# Patient Record
Sex: MALE | Race: OTHER | HISPANIC OR LATINO | URBAN - METROPOLITAN AREA
[De-identification: names, ages, dates, MRNs, and addresses within clinical notes are randomized per-mention and may not be internally consistent; named-entity substitution may affect disease eponyms.]

---

## 2020-03-22 ENCOUNTER — INPATIENT (INPATIENT)
Facility: HOSPITAL | Age: 60
LOS: 8 days | Discharge: HOME | DRG: 194 | End: 2020-03-31
Attending: INTERNAL MEDICINE | Admitting: INTERNAL MEDICINE
Payer: COMMERCIAL

## 2020-03-22 VITALS
TEMPERATURE: 99 F | RESPIRATION RATE: 18 BRPM | SYSTOLIC BLOOD PRESSURE: 127 MMHG | HEART RATE: 70 BPM | DIASTOLIC BLOOD PRESSURE: 77 MMHG | OXYGEN SATURATION: 97 % | HEIGHT: 68 IN | WEIGHT: 205.91 LBS

## 2020-03-22 DIAGNOSIS — J18.9 PNEUMONIA, UNSPECIFIED ORGANISM: ICD-10-CM

## 2020-03-22 DIAGNOSIS — N32.89 OTHER SPECIFIED DISORDERS OF BLADDER: ICD-10-CM

## 2020-03-22 DIAGNOSIS — Z29.9 ENCOUNTER FOR PROPHYLACTIC MEASURES, UNSPECIFIED: ICD-10-CM

## 2020-03-22 DIAGNOSIS — Z90.49 ACQUIRED ABSENCE OF OTHER SPECIFIED PARTS OF DIGESTIVE TRACT: Chronic | ICD-10-CM

## 2020-03-22 DIAGNOSIS — I10 ESSENTIAL (PRIMARY) HYPERTENSION: ICD-10-CM

## 2020-03-22 DIAGNOSIS — N17.9 ACUTE KIDNEY FAILURE, UNSPECIFIED: ICD-10-CM

## 2020-03-22 LAB
ALBUMIN SERPL ELPH-MCNC: 3.7 G/DL — SIGNIFICANT CHANGE UP (ref 3.5–5)
ALP SERPL-CCNC: 41 U/L — SIGNIFICANT CHANGE UP (ref 40–120)
ALT FLD-CCNC: 49 U/L DA — SIGNIFICANT CHANGE UP (ref 10–60)
ANION GAP SERPL CALC-SCNC: 12 MMOL/L — SIGNIFICANT CHANGE UP (ref 5–17)
APPEARANCE UR: CLEAR — SIGNIFICANT CHANGE UP
AST SERPL-CCNC: 61 U/L — HIGH (ref 10–40)
BASOPHILS # BLD AUTO: 0.01 K/UL — SIGNIFICANT CHANGE UP (ref 0–0.2)
BASOPHILS NFR BLD AUTO: 0.2 % — SIGNIFICANT CHANGE UP (ref 0–2)
BILIRUB SERPL-MCNC: 0.6 MG/DL — SIGNIFICANT CHANGE UP (ref 0.2–1.2)
BILIRUB UR-MCNC: NEGATIVE — SIGNIFICANT CHANGE UP
BUN SERPL-MCNC: 46 MG/DL — HIGH (ref 7–18)
CALCIUM SERPL-MCNC: 8.4 MG/DL — SIGNIFICANT CHANGE UP (ref 8.4–10.5)
CHLORIDE SERPL-SCNC: 98 MMOL/L — SIGNIFICANT CHANGE UP (ref 96–108)
CK SERPL-CCNC: 602 U/L — HIGH (ref 35–232)
CO2 SERPL-SCNC: 24 MMOL/L — SIGNIFICANT CHANGE UP (ref 22–31)
COLOR SPEC: YELLOW — SIGNIFICANT CHANGE UP
CREAT SERPL-MCNC: 2.39 MG/DL — HIGH (ref 0.5–1.3)
DIFF PNL FLD: ABNORMAL
EOSINOPHIL # BLD AUTO: 0 K/UL — SIGNIFICANT CHANGE UP (ref 0–0.5)
EOSINOPHIL NFR BLD AUTO: 0 % — SIGNIFICANT CHANGE UP (ref 0–6)
FLU A RESULT: SIGNIFICANT CHANGE UP
FLU A RESULT: SIGNIFICANT CHANGE UP
FLUAV AG NPH QL: SIGNIFICANT CHANGE UP
FLUBV AG NPH QL: SIGNIFICANT CHANGE UP
GLUCOSE SERPL-MCNC: 100 MG/DL — HIGH (ref 70–99)
GLUCOSE UR QL: 50 MG/DL
HCT VFR BLD CALC: 43.1 % — SIGNIFICANT CHANGE UP (ref 39–50)
HGB BLD-MCNC: 15.2 G/DL — SIGNIFICANT CHANGE UP (ref 13–17)
IMM GRANULOCYTES NFR BLD AUTO: 0.2 % — SIGNIFICANT CHANGE UP (ref 0–1.5)
KETONES UR-MCNC: NEGATIVE — SIGNIFICANT CHANGE UP
LACTATE SERPL-SCNC: 1 MMOL/L — SIGNIFICANT CHANGE UP (ref 0.7–2)
LEUKOCYTE ESTERASE UR-ACNC: NEGATIVE — SIGNIFICANT CHANGE UP
LYMPHOCYTES # BLD AUTO: 1.26 K/UL — SIGNIFICANT CHANGE UP (ref 1–3.3)
LYMPHOCYTES # BLD AUTO: 22.2 % — SIGNIFICANT CHANGE UP (ref 13–44)
MCHC RBC-ENTMCNC: 31.3 PG — SIGNIFICANT CHANGE UP (ref 27–34)
MCHC RBC-ENTMCNC: 35.3 GM/DL — SIGNIFICANT CHANGE UP (ref 32–36)
MCV RBC AUTO: 88.7 FL — SIGNIFICANT CHANGE UP (ref 80–100)
MONOCYTES # BLD AUTO: 0.48 K/UL — SIGNIFICANT CHANGE UP (ref 0–0.9)
MONOCYTES NFR BLD AUTO: 8.5 % — SIGNIFICANT CHANGE UP (ref 2–14)
NEUTROPHILS # BLD AUTO: 3.92 K/UL — SIGNIFICANT CHANGE UP (ref 1.8–7.4)
NEUTROPHILS NFR BLD AUTO: 68.9 % — SIGNIFICANT CHANGE UP (ref 43–77)
NITRITE UR-MCNC: NEGATIVE — SIGNIFICANT CHANGE UP
NRBC # BLD: 0 /100 WBCS — SIGNIFICANT CHANGE UP (ref 0–0)
PH UR: 5 — SIGNIFICANT CHANGE UP (ref 5–8)
PLATELET # BLD AUTO: 158 K/UL — SIGNIFICANT CHANGE UP (ref 150–400)
POTASSIUM SERPL-MCNC: 4.4 MMOL/L — SIGNIFICANT CHANGE UP (ref 3.5–5.3)
POTASSIUM SERPL-SCNC: 4.4 MMOL/L — SIGNIFICANT CHANGE UP (ref 3.5–5.3)
PROT SERPL-MCNC: 8.5 G/DL — HIGH (ref 6–8.3)
PROT UR-MCNC: 30 MG/DL
RBC # BLD: 4.86 M/UL — SIGNIFICANT CHANGE UP (ref 4.2–5.8)
RBC # FLD: 12.3 % — SIGNIFICANT CHANGE UP (ref 10.3–14.5)
RSV RESULT: SIGNIFICANT CHANGE UP
RSV RNA RESP QL NAA+PROBE: SIGNIFICANT CHANGE UP
SODIUM SERPL-SCNC: 134 MMOL/L — LOW (ref 135–145)
SP GR SPEC: 1.02 — SIGNIFICANT CHANGE UP (ref 1.01–1.02)
UROBILINOGEN FLD QL: NEGATIVE — SIGNIFICANT CHANGE UP
WBC # BLD: 5.68 K/UL — SIGNIFICANT CHANGE UP (ref 3.8–10.5)
WBC # FLD AUTO: 5.68 K/UL — SIGNIFICANT CHANGE UP (ref 3.8–10.5)

## 2020-03-22 PROCEDURE — 71046 X-RAY EXAM CHEST 2 VIEWS: CPT | Mod: 26

## 2020-03-22 PROCEDURE — 99285 EMERGENCY DEPT VISIT HI MDM: CPT

## 2020-03-22 PROCEDURE — 74176 CT ABD & PELVIS W/O CONTRAST: CPT | Mod: 26

## 2020-03-22 RX ORDER — SODIUM CHLORIDE 9 MG/ML
1000 INJECTION INTRAMUSCULAR; INTRAVENOUS; SUBCUTANEOUS ONCE
Refills: 0 | Status: COMPLETED | OUTPATIENT
Start: 2020-03-22 | End: 2020-03-22

## 2020-03-22 RX ORDER — ENOXAPARIN SODIUM 100 MG/ML
40 INJECTION SUBCUTANEOUS DAILY
Refills: 0 | Status: DISCONTINUED | OUTPATIENT
Start: 2020-03-22 | End: 2020-03-31

## 2020-03-22 RX ORDER — SODIUM CHLORIDE 9 MG/ML
1000 INJECTION INTRAMUSCULAR; INTRAVENOUS; SUBCUTANEOUS
Refills: 0 | Status: DISCONTINUED | OUTPATIENT
Start: 2020-03-22 | End: 2020-03-23

## 2020-03-22 RX ORDER — AMLODIPINE BESYLATE 2.5 MG/1
5 TABLET ORAL DAILY
Refills: 0 | Status: DISCONTINUED | OUTPATIENT
Start: 2020-03-22 | End: 2020-03-23

## 2020-03-22 RX ORDER — CEFTRIAXONE 500 MG/1
1000 INJECTION, POWDER, FOR SOLUTION INTRAMUSCULAR; INTRAVENOUS ONCE
Refills: 0 | Status: COMPLETED | OUTPATIENT
Start: 2020-03-22 | End: 2020-03-22

## 2020-03-22 RX ORDER — CEFTRIAXONE 500 MG/1
1000 INJECTION, POWDER, FOR SOLUTION INTRAMUSCULAR; INTRAVENOUS EVERY 24 HOURS
Refills: 0 | Status: DISCONTINUED | OUTPATIENT
Start: 2020-03-22 | End: 2020-03-26

## 2020-03-22 RX ORDER — LOSARTAN/HYDROCHLOROTHIAZIDE 100MG-25MG
0 TABLET ORAL
Qty: 0 | Refills: 0 | DISCHARGE

## 2020-03-22 RX ORDER — AMLODIPINE BESYLATE 2.5 MG/1
0 TABLET ORAL
Qty: 0 | Refills: 0 | DISCHARGE

## 2020-03-22 RX ORDER — AZITHROMYCIN 500 MG/1
500 TABLET, FILM COATED ORAL EVERY 24 HOURS
Refills: 0 | Status: DISCONTINUED | OUTPATIENT
Start: 2020-03-22 | End: 2020-03-25

## 2020-03-22 RX ORDER — AZITHROMYCIN 500 MG/1
500 TABLET, FILM COATED ORAL ONCE
Refills: 0 | Status: COMPLETED | OUTPATIENT
Start: 2020-03-22 | End: 2020-03-22

## 2020-03-22 RX ORDER — ACETAMINOPHEN 500 MG
650 TABLET ORAL ONCE
Refills: 0 | Status: COMPLETED | OUTPATIENT
Start: 2020-03-22 | End: 2020-03-23

## 2020-03-22 RX ORDER — ALBUTEROL 90 UG/1
2 AEROSOL, METERED ORAL EVERY 6 HOURS
Refills: 0 | Status: DISCONTINUED | OUTPATIENT
Start: 2020-03-22 | End: 2020-03-31

## 2020-03-22 RX ADMIN — AZITHROMYCIN 255 MILLIGRAM(S): 500 TABLET, FILM COATED ORAL at 16:17

## 2020-03-22 RX ADMIN — SODIUM CHLORIDE 1000 MILLILITER(S): 9 INJECTION INTRAMUSCULAR; INTRAVENOUS; SUBCUTANEOUS at 16:17

## 2020-03-22 RX ADMIN — CEFTRIAXONE 100 MILLIGRAM(S): 500 INJECTION, POWDER, FOR SOLUTION INTRAMUSCULAR; INTRAVENOUS at 17:17

## 2020-03-22 NOTE — H&P ADULT - ASSESSMENT
60 yo male with PMH of HTN presented to the ED for 1 week of fever upto 101-102, diarrhea, loss of appetite from past 5 days. He noticed slight blood in his urine x twice since yesterday. Denies any urinary frequency, urgency, hesitancy. Patient states that he took Motrin this morning prior to arrival at the ED. Patient otherwise denies any cough, shortness of breath, chest pain, and all other acute complaints. Patient notes that his wife is currently sick with a cough at home.      Admitted for multifocal PNa with concern for COVID 19

## 2020-03-22 NOTE — H&P ADULT - PROBLEM SELECTOR PLAN 3
p/w blood in urine   UA: +blood but no RBC   - CPK: added to admission lab (?rhabdo)   IV fluids   CT scan: Mural thickening of the urinary bladder, possibly due to underdistention. Underlying cystitis is not excluded.  - Rocephin for ?cystitis   - Follow Urine culture   - PRIMARY TEAM TO CONSULT UROLOGY IN AM

## 2020-03-22 NOTE — H&P ADULT - NSHPPHYSICALEXAM_GEN_ALL_CORE
Vital Signs Last 24 Hrs  T(C): 37 (22 Mar 2020 12:35), Max: 37 (22 Mar 2020 12:35)  T(F): 98.6 (22 Mar 2020 12:35), Max: 98.6 (22 Mar 2020 12:35)  HR: 70 (22 Mar 2020 12:35) (70 - 70)  BP: 127/77 (22 Mar 2020 12:35) (127/77 - 127/77)  RR: 18 (22 Mar 2020 12:35) (18 - 18)  SpO2: 97% (22 Mar 2020 12:35) (97% - 97%)    PHYSICAL EXAM:  GENERAL: male in bed   HEENT: Normocephalic;  conjunctivae and sclerae clear; moist mucous membranes;   NECK : supple  CHEST/LUNG: Clear to auscultation bilaterally with good air entry   HEART: S1 S2  regular; no murmurs, gallops or rubs  ABDOMEN: Soft, Nontender, Nondistended; Bowel sounds present  EXTREMITIES: no cyanosis; no edema; no calf tenderness  SKIN: warm and dry; no rash  NERVOUS SYSTEM:  Awake and alert; Oriented  to place, person and time ; no new deficits

## 2020-03-22 NOTE — H&P ADULT - HISTORY OF PRESENT ILLNESS
60 yo male with PMH of HTN presented to the ED for 1 week of fever upto 101-102, diarrhea, loss of appetite from past 5 days. He noticed slight blood in his urine x twice since yesterday. Denies any urinary frequency, urgency, hesitancy. Patient states that he took Motrin this morning prior to arrival at the ED. Patient otherwise denies any cough, shortness of breath, chest pain, and all other acute complaints. Patient notes that his wife is currently sick with a cough at home.

## 2020-03-22 NOTE — H&P ADULT - PROBLEM SELECTOR PLAN 1
p/w fever, diarrhea   - WBC: WNL, lymphocyte; WNl   - Flu: neg   - CXR: multifocal PNA   Ed course; Rocephin and Azithro   - Will c/w Rocephin and Azithro to cover for CAp.   - rule out covid 19; testing : contact and airborne isolation precaution   - LDH, Procalcitonin, ferritin   - Blood culture   - Tylenol, Albuterol Inhaler, Robitussin PRN  - IV fluids

## 2020-03-22 NOTE — ED PROVIDER NOTE - OBJECTIVE STATEMENT
59 year old male with no pertinent PMHx or PSHx presents to the ED with complaints of one week of fevers. Patient reports that earlier today he had a temperature of 101.0, but had a temperature of 102.0 earlier in the week. Patient additionally reports hematuria today. Patient states that he took Motrin this morning prior to arrival at the ED. Patient otherwise denies any cough, shortness of breath, chest pain, and all other acute complaints. Patient notes that his wife is currently sick with a cough at home. NKDA.

## 2020-03-22 NOTE — ED PROVIDER NOTE - CARE PLAN
Principal Discharge DX:	Bilateral pneumonia  Secondary Diagnosis:	Fever  Secondary Diagnosis:	Renal insufficiency

## 2020-03-22 NOTE — H&P ADULT - PROBLEM SELECTOR PLAN 4
- home med: Amlodipine , Losartan - HCTZ , not sure of the dose.   - Hold Losartan- HCTZ in setting of HANNAH   - c/w Amlodipine 5 mg for now   - Monitor BP

## 2020-03-22 NOTE — ED ADULT NURSE NOTE - OBJECTIVE STATEMENT
AOX4 +ambulatory patient reports blood in the urine, fever and cough x 5 days. Patient states +sick contacts at home. Patient took motrin before coming to the ED

## 2020-03-22 NOTE — H&P ADULT - PROBLEM SELECTOR PLAN 2
creat; 2.5   - Ed course: 1L   - IV fluids   - urine lytes ordered   - consider US renal once pt is off isolation   - Dr Fowler consulted   - Hold Losartan - HCTZ (home meds)   - Monitor BMP

## 2020-03-22 NOTE — ED PROVIDER NOTE - CLINICAL SUMMARY MEDICAL DECISION MAKING FREE TEXT BOX
Patient with fever for one week and hematuria. Did infectious workup showing multifocal pneumonia. Now suspicious for COVID-19. Will admit.

## 2020-03-22 NOTE — ED ADULT NURSE NOTE - NSIMPLEMENTINTERV_GEN_ALL_ED
Implemented All Universal Safety Interventions:  [place] to call system. Call bell, personal items and telephone within reach. Instruct patient to call for assistance. Room bathroom lighting operational. Non-slip footwear when patient is off stretcher. Physically safe environment: no spills, clutter or unnecessary equipment. Stretcher in lowest position, wheels locked, appropriate side rails in place.

## 2020-03-22 NOTE — H&P ADULT - PROBLEM SELECTOR PLAN 5
IMPROVE VTE Individual Risk Assessment  RISK                                                                Points  [  ] Previous VTE                                                  3  [  ] Thrombophilia                                               2  [  ] Lower limb paralysis                                      2        (unable to hold up >15 seconds)    [  ] Current Cancer                                              2         (within 6 months)  [x  ] Immobilization > 24 hrs                                1  [  ] ICU/CCU stay > 24 hours                              1  [x  ] Age > 60                                                      1  IMPROVE VTE Score : 2,lovenox for DVT proph

## 2020-03-23 DIAGNOSIS — R19.7 DIARRHEA, UNSPECIFIED: ICD-10-CM

## 2020-03-23 LAB
24R-OH-CALCIDIOL SERPL-MCNC: 29.7 NG/ML — LOW (ref 30–80)
ALBUMIN SERPL ELPH-MCNC: 3 G/DL — LOW (ref 3.5–5)
ALP SERPL-CCNC: 33 U/L — LOW (ref 40–120)
ALT FLD-CCNC: 39 U/L DA — SIGNIFICANT CHANGE UP (ref 10–60)
ANION GAP SERPL CALC-SCNC: 8 MMOL/L — SIGNIFICANT CHANGE UP (ref 5–17)
AST SERPL-CCNC: 43 U/L — HIGH (ref 10–40)
BASOPHILS # BLD AUTO: 0.01 K/UL — SIGNIFICANT CHANGE UP (ref 0–0.2)
BASOPHILS NFR BLD AUTO: 0.3 % — SIGNIFICANT CHANGE UP (ref 0–2)
BILIRUB SERPL-MCNC: 0.4 MG/DL — SIGNIFICANT CHANGE UP (ref 0.2–1.2)
BUN SERPL-MCNC: 32 MG/DL — HIGH (ref 7–18)
CALCIUM SERPL-MCNC: 7.5 MG/DL — LOW (ref 8.4–10.5)
CHLORIDE SERPL-SCNC: 106 MMOL/L — SIGNIFICANT CHANGE UP (ref 96–108)
CHOLEST SERPL-MCNC: 79 MG/DL — SIGNIFICANT CHANGE UP (ref 10–199)
CK SERPL-CCNC: 511 U/L — HIGH (ref 35–232)
CO2 SERPL-SCNC: 25 MMOL/L — SIGNIFICANT CHANGE UP (ref 22–31)
CREAT ?TM UR-MCNC: 118 MG/DL — SIGNIFICANT CHANGE UP
CREAT SERPL-MCNC: 1.33 MG/DL — HIGH (ref 0.5–1.3)
CULTURE RESULTS: NO GROWTH — SIGNIFICANT CHANGE UP
EOSINOPHIL # BLD AUTO: 0 K/UL — SIGNIFICANT CHANGE UP (ref 0–0.5)
EOSINOPHIL NFR BLD AUTO: 0 % — SIGNIFICANT CHANGE UP (ref 0–6)
FERRITIN SERPL-MCNC: 1358 NG/ML — HIGH (ref 30–400)
FOLATE SERPL-MCNC: >20 NG/ML — SIGNIFICANT CHANGE UP
GLUCOSE SERPL-MCNC: 143 MG/DL — HIGH (ref 70–99)
HBA1C BLD-MCNC: 5.7 % — HIGH (ref 4–5.6)
HCT VFR BLD CALC: 38.4 % — LOW (ref 39–50)
HCV AB S/CO SERPL IA: 0.15 S/CO — SIGNIFICANT CHANGE UP (ref 0–0.99)
HCV AB SERPL-IMP: SIGNIFICANT CHANGE UP
HDLC SERPL-MCNC: 22 MG/DL — LOW
HGB BLD-MCNC: 13.3 G/DL — SIGNIFICANT CHANGE UP (ref 13–17)
IMM GRANULOCYTES NFR BLD AUTO: 0.3 % — SIGNIFICANT CHANGE UP (ref 0–1.5)
LDH SERPL L TO P-CCNC: 214 U/L — SIGNIFICANT CHANGE UP (ref 120–225)
LIPID PNL WITH DIRECT LDL SERPL: 29 MG/DL — SIGNIFICANT CHANGE UP
LYMPHOCYTES # BLD AUTO: 0.8 K/UL — LOW (ref 1–3.3)
LYMPHOCYTES # BLD AUTO: 21.1 % — SIGNIFICANT CHANGE UP (ref 13–44)
MAGNESIUM SERPL-MCNC: 2.5 MG/DL — SIGNIFICANT CHANGE UP (ref 1.6–2.6)
MCHC RBC-ENTMCNC: 30.6 PG — SIGNIFICANT CHANGE UP (ref 27–34)
MCHC RBC-ENTMCNC: 34.6 GM/DL — SIGNIFICANT CHANGE UP (ref 32–36)
MCV RBC AUTO: 88.3 FL — SIGNIFICANT CHANGE UP (ref 80–100)
MONOCYTES # BLD AUTO: 0.28 K/UL — SIGNIFICANT CHANGE UP (ref 0–0.9)
MONOCYTES NFR BLD AUTO: 7.4 % — SIGNIFICANT CHANGE UP (ref 2–14)
NEUTROPHILS # BLD AUTO: 2.7 K/UL — SIGNIFICANT CHANGE UP (ref 1.8–7.4)
NEUTROPHILS NFR BLD AUTO: 70.9 % — SIGNIFICANT CHANGE UP (ref 43–77)
NRBC # BLD: 0 /100 WBCS — SIGNIFICANT CHANGE UP (ref 0–0)
OSMOLALITY UR: 479 MOS/KG — SIGNIFICANT CHANGE UP (ref 50–1200)
PHOSPHATE SERPL-MCNC: 2 MG/DL — LOW (ref 2.5–4.5)
PLATELET # BLD AUTO: 150 K/UL — SIGNIFICANT CHANGE UP (ref 150–400)
POTASSIUM SERPL-MCNC: 3.1 MMOL/L — LOW (ref 3.5–5.3)
POTASSIUM SERPL-SCNC: 3.1 MMOL/L — LOW (ref 3.5–5.3)
PROCALCITONIN SERPL-MCNC: 0.08 NG/ML — SIGNIFICANT CHANGE UP (ref 0.02–0.1)
PROT ?TM UR-MCNC: 27 MG/DL — HIGH (ref 0–12)
PROT SERPL-MCNC: 6.9 G/DL — SIGNIFICANT CHANGE UP (ref 6–8.3)
RBC # BLD: 4.35 M/UL — SIGNIFICANT CHANGE UP (ref 4.2–5.8)
RBC # FLD: 12.5 % — SIGNIFICANT CHANGE UP (ref 10.3–14.5)
SODIUM SERPL-SCNC: 139 MMOL/L — SIGNIFICANT CHANGE UP (ref 135–145)
SODIUM UR-SCNC: 48 MMOL/L — SIGNIFICANT CHANGE UP
SPECIMEN SOURCE: SIGNIFICANT CHANGE UP
TOTAL CHOLESTEROL/HDL RATIO MEASUREMENT: 3.6 RATIO — SIGNIFICANT CHANGE UP (ref 3.4–9.6)
TRIGL SERPL-MCNC: 141 MG/DL — SIGNIFICANT CHANGE UP (ref 10–149)
TSH SERPL-MCNC: 0.52 UU/ML — SIGNIFICANT CHANGE UP (ref 0.34–4.82)
VIT B12 SERPL-MCNC: 962 PG/ML — SIGNIFICANT CHANGE UP (ref 232–1245)
WBC # BLD: 3.8 K/UL — SIGNIFICANT CHANGE UP (ref 3.8–10.5)
WBC # FLD AUTO: 3.8 K/UL — SIGNIFICANT CHANGE UP (ref 3.8–10.5)

## 2020-03-23 RX ORDER — POTASSIUM PHOSPHATE, MONOBASIC POTASSIUM PHOSPHATE, DIBASIC 236; 224 MG/ML; MG/ML
15 INJECTION, SOLUTION INTRAVENOUS ONCE
Refills: 0 | Status: COMPLETED | OUTPATIENT
Start: 2020-03-23 | End: 2020-03-23

## 2020-03-23 RX ORDER — CHOLECALCIFEROL (VITAMIN D3) 125 MCG
1000 CAPSULE ORAL DAILY
Refills: 0 | Status: DISCONTINUED | OUTPATIENT
Start: 2020-03-23 | End: 2020-03-31

## 2020-03-23 RX ORDER — SODIUM CHLORIDE 9 MG/ML
1000 INJECTION, SOLUTION INTRAVENOUS
Refills: 0 | Status: DISCONTINUED | OUTPATIENT
Start: 2020-03-23 | End: 2020-03-31

## 2020-03-23 RX ORDER — ACETAMINOPHEN 500 MG
650 TABLET ORAL EVERY 6 HOURS
Refills: 0 | Status: DISCONTINUED | OUTPATIENT
Start: 2020-03-23 | End: 2020-03-31

## 2020-03-23 RX ORDER — SODIUM CHLORIDE 9 MG/ML
1000 INJECTION INTRAMUSCULAR; INTRAVENOUS; SUBCUTANEOUS
Refills: 0 | Status: DISCONTINUED | OUTPATIENT
Start: 2020-03-23 | End: 2020-03-24

## 2020-03-23 RX ORDER — POTASSIUM CHLORIDE 20 MEQ
40 PACKET (EA) ORAL EVERY 4 HOURS
Refills: 0 | Status: COMPLETED | OUTPATIENT
Start: 2020-03-23 | End: 2020-03-23

## 2020-03-23 RX ADMIN — Medication 650 MILLIGRAM(S): at 10:23

## 2020-03-23 RX ADMIN — SODIUM CHLORIDE 200 MILLILITER(S): 9 INJECTION INTRAMUSCULAR; INTRAVENOUS; SUBCUTANEOUS at 01:30

## 2020-03-23 RX ADMIN — CEFTRIAXONE 100 MILLIGRAM(S): 500 INJECTION, POWDER, FOR SOLUTION INTRAMUSCULAR; INTRAVENOUS at 13:21

## 2020-03-23 RX ADMIN — Medication 200 MILLIGRAM(S): at 20:20

## 2020-03-23 RX ADMIN — Medication 650 MILLIGRAM(S): at 06:00

## 2020-03-23 RX ADMIN — Medication 650 MILLIGRAM(S): at 09:23

## 2020-03-23 RX ADMIN — Medication 200 MILLIGRAM(S): at 06:07

## 2020-03-23 RX ADMIN — SODIUM CHLORIDE 200 MILLILITER(S): 9 INJECTION INTRAMUSCULAR; INTRAVENOUS; SUBCUTANEOUS at 07:56

## 2020-03-23 RX ADMIN — Medication 650 MILLIGRAM(S): at 20:20

## 2020-03-23 RX ADMIN — ENOXAPARIN SODIUM 40 MILLIGRAM(S): 100 INJECTION SUBCUTANEOUS at 12:09

## 2020-03-23 RX ADMIN — SODIUM CHLORIDE 100 MILLILITER(S): 9 INJECTION INTRAMUSCULAR; INTRAVENOUS; SUBCUTANEOUS at 09:07

## 2020-03-23 RX ADMIN — Medication 40 MILLIEQUIVALENT(S): at 13:21

## 2020-03-23 RX ADMIN — AZITHROMYCIN 255 MILLIGRAM(S): 500 TABLET, FILM COATED ORAL at 13:21

## 2020-03-23 RX ADMIN — AMLODIPINE BESYLATE 5 MILLIGRAM(S): 2.5 TABLET ORAL at 06:06

## 2020-03-23 RX ADMIN — POTASSIUM PHOSPHATE, MONOBASIC POTASSIUM PHOSPHATE, DIBASIC 62.5 MILLIMOLE(S): 236; 224 INJECTION, SOLUTION INTRAVENOUS at 17:22

## 2020-03-23 RX ADMIN — Medication 40 MILLIEQUIVALENT(S): at 17:02

## 2020-03-23 RX ADMIN — Medication 650 MILLIGRAM(S): at 01:30

## 2020-03-23 NOTE — CONSULT NOTE ADULT - PROBLEM SELECTOR RECOMMENDATION 4
Has fever as well  R/o Covid-19 infection  Droplets and contact isolation  monitor temp  IVF  Stool exam  GI eval

## 2020-03-23 NOTE — CONSULT NOTE ADULT - PROBLEM SELECTOR RECOMMENDATION 3
Avoid nephrotoxic drugs  monitor BMP  Renal eval Avoid nephrotoxic drugs  monitor BMP  IVF  Renal eval

## 2020-03-23 NOTE — PROGRESS NOTE ADULT - SUBJECTIVE AND OBJECTIVE BOX
PGY 1 Note discussed with supervising resident and primary attending    Patient is a 59y old  Male who presents with a chief complaint of hematuria and fever (23 Mar 2020 08:13)    INTERVAL HPI/OVERNIGHT EVENTS: Patient seen and examined at the bedside. Patient denies any complaints or concerns at this time. Denies any shortness of breath or cough. Denies any pain anywhere. Denies any hematuria. Creatinine noted to be elevated and creatinine kinase elevated. Currently on IVF. COVID testing.     MEDICATIONS  (STANDING):  azithromycin  IVPB 500 milliGRAM(s) IV Intermittent every 24 hours  cefTRIAXone   IVPB 1000 milliGRAM(s) IV Intermittent every 24 hours  enoxaparin Injectable 40 milliGRAM(s) SubCutaneous daily  sodium chloride 0.9%. 1000 milliLiter(s) (100 mL/Hr) IV Continuous <Continuous>    MEDICATIONS  (PRN):  acetaminophen   Tablet .. 650 milliGRAM(s) Oral every 6 hours PRN Temp greater or equal to 38C (100.4F), Moderate Pain (4 - 6)  ALBUTerol    90 MICROgram(s) HFA Inhaler 2 Puff(s) Inhalation every 6 hours PRN Bronchospasm  guaiFENesin   Syrup  (Sugar-Free) 200 milliGRAM(s) Oral every 6 hours PRN Cough      __________________________________________________  REVIEW OF SYSTEMS:  CONSTITUTIONAL: No fever  EYES: no visual disturbances  NECK: No pain   RESPIRATORY: No cough; No shortness of breath  CARDIOVASCULAR: No chest pain  GASTROINTESTINAL: No pain. No nausea or vomiting   NEUROLOGICAL: No headache   MUSCULOSKELETAL: No joint pain, no muscle pain  GENITOURINARY: no dysuria; no hematuria  PSYCHIATRY: no depression   ALL OTHER  ROS negative        Vital Signs Last 24 Hrs  T(C): 38.3 (23 Mar 2020 08:40), Max: 38.6 (23 Mar 2020 01:26)  T(F): 101 (23 Mar 2020 08:40), Max: 101.5 (23 Mar 2020 01:26)  HR: 83 (23 Mar 2020 08:40) (57 - 83)  BP: 136/74 (23 Mar 2020 08:40) (109/66 - 138/79)  RR: 18 (23 Mar 2020 08:40) (18 - 18)  SpO2: 97% (23 Mar 2020 08:40) (95% - 97%)    ________________________________________________  PHYSICAL EXAM:  GENERAL: Patient seen resting in bed and in no apparent distress  HEENT: Normocephalic; conjunctivae and sclerae clear   NECK: supple  CHEST/LUNG: Clear to auscultation bilaterally   HEART: S1 S2, regular; no murmurs  ABDOMEN: Soft, Nontender, Nondistended; Bowel sounds present  EXTREMITIES: no edema; no calf tenderness  SKIN: warm and dry  NERVOUS SYSTEM: Awake and alert; Oriented to place, person and time     _________________________________________________  LABS:                        15.2   5.68  )-----------( 158      ( 22 Mar 2020 14:00 )             43.1         134<L>  |  98  |  46<H>  ----------------------------<  100<H>  4.4   |  24  |  2.39<H>    Ca    8.4      22 Mar 2020 14:00    TPro  8.5<H>  /  Alb  3.7  /  TBili  0.6  /  DBili  x   /  AST  61<H>  /  ALT  49  /  AlkPhos  41  22      Urinalysis Basic - ( 22 Mar 2020 13:35 )    Color: Yellow / Appearance: Clear / S.020 / pH: x  Gluc: x / Ketone: Negative  / Bili: Negative / Urobili: Negative   Blood: x / Protein: 30 mg/dL / Nitrite: Negative   Leuk Esterase: Negative / RBC: 0-2 /HPF / WBC 3-5 /HPF   Sq Epi: x / Non Sq Epi: Occasional /HPF / Bacteria: x      RADIOLOGY & ADDITIONAL TESTS:    Imaging Personally Reviewed:  YES    < from: CT Abdomen and Pelvis No Cont (20 @ 15:27) >  IMPRESSION:     Mural thickening of the urinary bladder, possibly due to underdistention. Underlying cystitis is not excluded.    < end of copied text >    < from: Xray Chest 2 Views PA/Lat (20 @ 15:38) >    IMPRESSION:  Patchy airspace opacities noted in the left upper lobe and right lower lobe suspicious for multifocal pneumonia.    < end of copied text >    Consultant(s) Notes Reviewed:   YES    Care Discussed with Consultants :     Plan of care was discussed with patient and /or primary care giver; all questions and concerns were addressed and care was aligned with patient's wishes.

## 2020-03-23 NOTE — PROGRESS NOTE ADULT - SUBJECTIVE AND OBJECTIVE BOX
58 yo male with PMH of HTN presented to the ED for 1 week of fever upto 101-102, diarrhea, loss of appetite from past 5 days. He noticed slight blood in his urine x twice since yesterday. Denies any urinary frequency, urgency, hesitancy. Patient states that he took Motrin this morning prior to arrival at the ED. Patient otherwise denies any cough, shortness of breath, chest pain, and all other acute complaints. Patient notes that his wife is currently sick with a cough at home.        Review of Systems:  Other Review of Systems: All other review of systems negative, except as noted in HPI	      pt seen in tele [ x ], reg med floor [   ], bed [ x ], chair at bedside [   ], a+o x3 [x  ], lethargic [  ],  nad [ x ]          Allergies    No Known Allergies        Vitals    T(F): 99.5 (03-23-20 @ 06:22), Max: 101.5 (03-23-20 @ 01:26)  HR: 57 (03-23-20 @ 06:22) (57 - 80)  BP: 109/66 (03-23-20 @ 06:22) (109/66 - 138/79)  RR: 18 (03-23-20 @ 06:22) (18 - 18)  SpO2: 95% (03-23-20 @ 06:22) (95% - 97%)  Wt(kg): --  CAPILLARY BLOOD GLUCOSE          Labs                          15.2   5.68  )-----------( 158      ( 22 Mar 2020 14:00 )             43.1       03-22    134<L>  |  98  |  46<H>  ----------------------------<  100<H>  4.4   |  24  |  2.39<H>    Ca    8.4      22 Mar 2020 14:00    TPro  8.5<H>  /  Alb  3.7  /  TBili  0.6  /  DBili  x   /  AST  61<H>  /  ALT  49  /  AlkPhos  41  03-22      CARDIAC MARKERS ( 22 Mar 2020 20:22 )  x     / x     / 602 U/L / x     / x          FLU A B RSV Detection by PCR (03.22.20 @ 20:50)    Flu A Result: NotDetec: The Flu A B RSV assay is a Real-Time PCR test for the qualitative  detection and differentiation of Influenza A, Influenza B, and  Respiratory Syncytial Virus on nasopharyngeal swabs. The results should  be interpreted in the context of all clinical and laboratory findings.    Flu B Result: NotDete    RSV Result: [facility]            Radiology Results    < from: CT Abdomen and Pelvis No Cont (03.22.20 @ 15:27) >  IMPRESSION:     Mural thickening of the urinary bladder, possibly due to underdistention. Underlying cystitis is not excluded.    < end of copied text >        < from: Xray Chest 2 Views PA/Lat (03.22.20 @ 15:38) >  IMPRESSION:  Patchy airspace opacities noted in the left upper lobe and right lower lobe suspicious for multifocal pneumonia.    < end of copied text >    Meds    MEDICATIONS  (STANDING):  azithromycin  IVPB 500 milliGRAM(s) IV Intermittent every 24 hours  cefTRIAXone   IVPB 1000 milliGRAM(s) IV Intermittent every 24 hours  enoxaparin Injectable 40 milliGRAM(s) SubCutaneous daily  sodium chloride 0.9%. 1000 milliLiter(s) (100 mL/Hr) IV Continuous <Continuous>      MEDICATIONS  (PRN):  ALBUTerol    90 MICROgram(s) HFA Inhaler 2 Puff(s) Inhalation every 6 hours PRN Bronchospasm  guaiFENesin   Syrup  (Sugar-Free) 200 milliGRAM(s) Oral every 6 hours PRN Cough      Physical Exam    Neuro :  no focal deficits  Respiratory: CTA B/L  CV: RRR, S1S2, no murmurs,   Abdominal: Soft, NT, ND +BS,  Extremities: No edema, + peripheral pulses    ASSESSMENT    multifocal Pneumonia due to organism   r/o covid 19   mild rhabdomyolysis  juno  hyponatremia  h/o HTN (hypertension)  H/O colectomy        PLAN      cont roceph and zithromax  f/u bld cx    rsv neg noted above  f/u covid -19 results  contact and airborne isolation  cont albuterol inhaler   f/u procalcitonin, legionella Ag, strep, mycoplasma Ag  F/u aptt, inr, D-dimer, esr, crp, ldh, ferritin, lactate, t cell subset  cont supportive care with tylenol prn, robitussin prn and O2 via nasal canula if needed  pulm cons   ct abd with Mural thickening of the urinary bladder, possibly due to underdistention. Underlying cystitis is not excluded noted above.   doubt hematuria at present as ua neg for rbc's or infectious process  ck elevated  cont ivf  renal cons noted  check abg   cont current meds

## 2020-03-23 NOTE — PROGRESS NOTE ADULT - ASSESSMENT
58 yo male with PMH of HTN presented to the ED for 1 week of fever upto 101-102, diarrhea, loss of appetite from past 5 days. He noticed slight blood in his urine x twice since yesterday. Denies any urinary frequency, urgency, hesitancy. Patient states that he took Motrin this morning prior to arrival at the ED. Patient otherwise denies any cough, shortness of breath, chest pain, and all other acute complaints. Patient notes that his wife is currently sick with a cough at home.    Admitted for multifocal PNa with concern for COVID 19

## 2020-03-23 NOTE — CONSULT NOTE ADULT - SUBJECTIVE AND OBJECTIVE BOX
PULMONARY CONSULT NOTE      EMILY DELGADO  MRN-[id number]    Patient is a 59y old  Male who presents with a chief complaint of hematuria and fever (23 Mar 2020 08:13)    History of Present Illness:  Reason for Admission: hematuria and fever	  History of Present Illness: 	  58 yo male with PMH of HTN presented to the ED for 1 week of fever upto 101-102, diarrhea, loss of appetite from past 5 days. He noticed slight blood in his urine x twice since yesterday. Denies any urinary frequency, urgency, hesitancy. Patient states that he took Motrin this morning prior to arrival at the ED. Patient otherwise denies any cough, shortness of breath, chest pain, and all other acute complaints. Patient notes that his wife is currently sick with a cough at home.    HISTORY OF PRESENT ILLNESS: As above. Awake, alert, comfortable in bed in NAD    MEDICATIONS  (STANDING):  azithromycin  IVPB 500 milliGRAM(s) IV Intermittent every 24 hours  cefTRIAXone   IVPB 1000 milliGRAM(s) IV Intermittent every 24 hours  enoxaparin Injectable 40 milliGRAM(s) SubCutaneous daily  sodium chloride 0.9%. 1000 milliLiter(s) (100 mL/Hr) IV Continuous <Continuous>      MEDICATIONS  (PRN):  acetaminophen   Tablet .. 650 milliGRAM(s) Oral every 6 hours PRN Temp greater or equal to 38C (100.4F), Moderate Pain (4 - 6)  ALBUTerol    90 MICROgram(s) HFA Inhaler 2 Puff(s) Inhalation every 6 hours PRN Bronchospasm  guaiFENesin   Syrup  (Sugar-Free) 200 milliGRAM(s) Oral every 6 hours PRN Cough      Allergies    No Known Allergies    Intolerances        PAST MEDICAL & SURGICAL HISTORY:  HTN (hypertension)  H/O colectomy      FAMILY HISTORY:  No pertinent family history in first degree relatives      SOCIAL HISTORY  Smoking History:     REVIEW OF SYSTEMS:    CONSTITUTIONAL:  No fevers, chills, sweats    HEENT:  Eyes:  No diplopia or blurred vision. ENT:  No earache, sore throat or runny nose.    CARDIOVASCULAR:  No pressure, squeezing, tightness, or heaviness about the chest; no palpitations.    RESPIRATORY:  Per HPI    GASTROINTESTINAL:  No abdominal pain, nausea, vomiting or diarrhea.    GENITOURINARY:  No dysuria, frequency or urgency.    NEUROLOGIC:  No paresthesias, fasciculations, seizures or weakness.    PSYCHIATRIC:  No disorder of thought or mood.    Vital Signs Last 24 Hrs  T(C): 38.3 (23 Mar 2020 08:40), Max: 38.6 (23 Mar 2020 01:26)  T(F): 101 (23 Mar 2020 08:40), Max: 101.5 (23 Mar 2020 01:26)  HR: 83 (23 Mar 2020 08:40) (57 - 83)  BP: 136/74 (23 Mar 2020 08:40) (109/66 - 138/79)  BP(mean): --  RR: 18 (23 Mar 2020 08:40) (18 - 18)  SpO2: 97% (23 Mar 2020 08:40) (95% - 97%)  I&O's Detail    22 Mar 2020 07:01  -  23 Mar 2020 07:00  --------------------------------------------------------  IN:    sodium chloride 0.9%: 400 mL  Total IN: 400 mL    OUT:  Total OUT: 0 mL    Total NET: 400 mL          PHYSICAL EXAMINATION:    GENERAL: The patient is a well-developed, well-nourished _____in no apparent distress.     HEENT: Head is normocephalic and atraumatic. Extraocular muscles are intact. Mucous membranes are moist.     NECK: Supple.     LUNGS: Rales post    HEART: Regular rate and rhythm without murmur.    ABDOMEN: Soft, nontender, and nondistended.  No hepatosplenomegaly is noted.    EXTREMITIES: Without any cyanosis, clubbing, rash, lesions or edema.    NEUROLOGIC: Grossly intact.      LABS:                        15.2   5.68  )-----------( 158      ( 22 Mar 2020 14:00 )             43.1     03-    134<L>  |  98  |  46<H>  ----------------------------<  100<H>  4.4   |  24  |  2.39<H>    Ca    8.4      22 Mar 2020 14:00    TPro  8.5<H>  /  Alb  3.7  /  TBili  0.6  /  DBili  x   /  AST  61<H>  /  ALT  49  /  AlkPhos  41        Urinalysis Basic - ( 22 Mar 2020 13:35 )    Color: Yellow / Appearance: Clear / S.020 / pH: x  Gluc: x / Ketone: Negative  / Bili: Negative / Urobili: Negative   Blood: x / Protein: 30 mg/dL / Nitrite: Negative   Leuk Esterase: Negative / RBC: 0-2 /HPF / WBC 3-5 /HPF   Sq Epi: x / Non Sq Epi: Occasional /HPF / Bacteria: x        CARDIAC MARKERS ( 22 Mar 2020 20:22 )  x     / x     / 602 U/L / x     / x              Lactate, Blood: 1.0 mmol/L (20 @ 14:00)        MICROBIOLOGY:    RADIOLOGY & ADDITIONAL STUDIES:  < from: Xray Chest 2 Views PA/Lat (20 @ 15:38) >  IMPRESSION:  Patchy airspace opacities noted in the left upper lobe and right lower lobe suspicious for multifocal pneumonia.    < end of copied text >    CXR:  < from: CT Abdomen and Pelvis No Cont (20 @ 15:27) >  IMPRESSION:     Mural thickening of the urinary bladder, possibly due to underdistention. Underlying cystitis is not excluded.    < end of copied text >    Ct scan chest:    ekg;    echo:

## 2020-03-23 NOTE — CONSULT NOTE ADULT - ASSESSMENT
HANNAH due to dehydration , hypovolemia, pre renal  R/O COVID 19 infection  Reduced BP in am.    In view of the renal failure, hypotension and possible COVID 19 or other infection.  DC Amlodipine.  Reduce IV fluids to 100 ml/hour  Follow fluid status  Follow daily renal function  Follow magnesium and po4 and potassium, change IV fluids to Ringer lactate. HANNAH due to dehydration , hypovolemia, pre renal  R/O COVID 19 infection  Reduced BP in am.  Bilateral pneumonia as per radiology.    In view of the renal failure, hypotension and possible COVID 19 or other infection.  DC Amlodipine.  Reduce IV fluids to 100 ml/hour  Follow fluid status  Follow daily renal function  Follow magnesium and po4 and potassium, change IV fluids to Ringer lactate.

## 2020-03-23 NOTE — CONSULT NOTE ADULT - SUBJECTIVE AND OBJECTIVE BOX
Chief complain/HPI  History of Present Illness:  Reason for Admission: hematuria and fever	  History of Present Illness: 	  60 yo male with PMH of HTN presented to the ED for 1 week of fever upto 101-102, diarrhea, loss of appetite from past 5 days. He noticed slight blood in his urine x twice since yesterday. Denies any urinary frequency, urgency, hesitancy. Patient states that he took Motrin this morning prior to arrival at the ED. Patient otherwise denies any cough, shortness of breath, chest pain, and all other acute complaints. Patient notes that his wife is currently sick with a cough at home.    22-Mar-2020 14:00, Complete Blood Count + Automated Diff	  WBC Count: 5.68, [3.80 - 10.50 K/uL]	  RBC Count: 4.86, [4.20 - 5.80 M/uL]	  Hemoglobin: 15.2, [13.0 - 17.0 g/dL]	  Hematocrit: 43.1, [39.0 - 50.0 %]	  Mean Cell Volume: 88.7, [80.0 - 100.0 fl]	  Mean Cell Hemoglobin: 31.3, [27.0 - 34.0 pg]	  Mean Cell Hemoglobin Conc: 35.3, [32.0 - 36.0 gm/dL]	  Red Cell Distrib Width: 12.3, [10.3 - 14.5 %]	  Platelet Count - Automated: 158, [150 - 400 K/uL]	  Auto Neutrophil #: 3.92, [1.80 - 7.40 K/uL]	  Auto Lymphocyte #: 1.26, [1.00 - 3.30 K/uL]	  Auto Monocyte #: 0.48, [0.00 - 0.90 K/uL]	  Auto Eosinophil #: 0.00, [0.00 - 0.50 K/uL]	  Auto Basophil #: 0.01, [0.00 - 0.20 K/uL]	  Auto Neutrophil %: 68.9, [43.0 - 77.0 %], Differential percentages must be correlated with absolute numbers for  clinical significance.	    22-Mar-2020 14:00, Comprehensive Metabolic Panel	  Sodium, Serum:   134, [135 - 145 mmol/L]	  Potassium, Serum: 4.4, [3.5 - 5.3 mmol/L], Specimen is moderately hemolyzed, results may be affected	  Chloride, Serum: 98, [96 - 108 mmol/L]	  Carbon Dioxide, Serum: 24, [22 - 31 mmol/L]	  Anion Gap, Serum: 12, [5 - 17 mmol/L]	  Blood Urea Nitrogen, Serum:   46, [7 - 18 mg/dL]	  Creatinine, Serum:   2.39, [0.50 - 1.30 mg/dL]	  Glucose, Serum:   100, [70 - 99 mg/dL]	  Calcium, Total Serum: 8.4, [8.4 - 10.5 mg/dL]	  Protein Total, Serum:   8.5, [6.0 - 8.3 g/dL]	  Albumin, Serum: 3.7, [3.5 - 5.0 g/dL]	  Bilirubin Total, Serum: 0.6, [0.2 - 1.2 mg/dL]	  Alkaline Phosphatase, Serum: 41, [40 - 120 U/L]	  Aspartate Aminotransferase (AST/SGOT):   61, [10 - 40 U/L], Specimen is moderately hemolyzed, results may be affected	  Alanine Aminotransferase (ALT/SGPT): 49, [10 - 60 U/L DA]	  EGFR 28  Urine:	    22-Mar-2020 13:35, Urinalysis	  Color: Yellow, [Yellow]	  Urine Appearance: Clear, [Clear]	  Bilirubin: Negative, [Negative]	  Ketone - Urine: Negative, [Negative]	  Specific Gravity: 1.020, [1.010 - 1.025]	  Protein, Urine:   30, [Negative mg/dL]	  Urobilinogen: Negative, [Negative]	  Nitrite: Negative, [Negative]	  Leukocyte Esterase Concentration: Negative, [Negative]	  Blood, Urine:   Moderate, [Negative]	  Glucose Qualitative, Urine:   50, [Negative mg/dL]	  pH Urine: 5.0, [5.0 - 8.0]	    22-Mar-2020 13:35, Urine Microscopic-Add On (NC)	  Comment - Urine: Few Transitional Epithelial Cells	  Epithelial Cells: , [Negative /HPF], Occasional	  Red Blood Cell - Urine: 0-2, [0 - 2 /HPF]	  White Blood Cell - Urine: 3-5, [0 - 5 /HPF]	    ay Chest 2 Views PA/Lat: EXAM:  XR CHEST PA LAT 2V                        	  	  	PROCEDURE DATE:  2020    	  	  	  	INTERPRETATION:  Chest radiographs     CPT 94675  	  	CLINICAL INFORMATION:  Patient is unable to communicate. Fever.  Short of breath.   	  	TECHNIQUE:  Frontal and lateral views of the chest were obtained.  	  	FINDINGS:  No previous examinations are available for review.  	  	The lungs demonstrate patchy airspace opacities in the left upper lobe and right lower lobe suspicious for multifocal pneumonia. No pleural effusion is seen. The heart and mediastinum appear intact.  	  	  	IMPRESSION:  Patchy airspace opacities noted in the left upper lobe and right lower lobe suspicious for multifocal pneumonia.  	    PAST MEDICAL & SURGICAL HISTORY:  HTN (hypertension)  H/O colectomy      Home Medications Reviewed  Home Medications:   * Patient Currently Takes Medications as of 22-Mar-2020 20:11 documented in Structured Notes  · 	hydrochlorothiazide-losartan:   · 	amLODIPine:       Hospital Medications:   MEDICATIONS  (STANDING):  amLODIPine   Tablet 5 milliGRAM(s) Oral daily  azithromycin  IVPB 500 milliGRAM(s) IV Intermittent every 24 hours  cefTRIAXone   IVPB 1000 milliGRAM(s) IV Intermittent every 24 hours  enoxaparin Injectable 40 milliGRAM(s) SubCutaneous daily  sodium chloride 0.9%. 1000 milliLiter(s) (200 mL/Hr) IV Continuous <Continuous>    MEDICATIONS  (PRN):  ALBUTerol    90 MICROgram(s) HFA Inhaler 2 Puff(s) Inhalation every 6 hours PRN Bronchospasm  guaiFENesin   Syrup  (Sugar-Free) 200 milliGRAM(s) Oral every 6 hours PRN Cough      Allergies    No Known Allergies    Intolerances                              15.2   5.68  )-----------( 158      ( 22 Mar 2020 14:00 )             43.1     03-22    134<L>  |  98  |  46<H>  ----------------------------<  100<H>  4.4   |  24  |  2.39<H>    Ca    8.4      22 Mar 2020 14:00    TPro  8.5<H>  /  Alb  3.7  /  TBili  0.6  /  DBili  x   /  AST  61<H>  /  ALT  49  /  AlkPhos  41  03      Urinalysis Basic - ( 22 Mar 2020 13:35 )    Color: Yellow / Appearance: Clear / S.020 / pH: x  Gluc: x / Ketone: Negative  / Bili: Negative / Urobili: Negative   Blood: x / Protein: 30 mg/dL / Nitrite: Negative   Leuk Esterase: Negative / RBC: 0-2 /HPF / WBC 3-5 /HPF   Sq Epi: x / Non Sq Epi: Occasional /HPF / Bacteria: x      Sodium, Random Urine: 48 mmol/L ( @ 06:55)  Creatinine, Random Urine: 118 mg/dL ( @ 06:55)        RADIOLOGY & ADDITIONAL STUDIES:    SOCIAL HISTORY: Denies ETOh,Smoking,     FAMILY HISTORY:  No pertinent family history in first degree relatives      REVIEW OF SYSTEMS:  CONSTITUTIONAL: No malaise, No fatigue, No fevers or chills, well developed, no diaphoresis  EYES/ENT: No visual changes;  No vertigo or throat pain   NECK: No pain or stiffness  RESPIRATORY: No cough, wheezing, hemoptysis; No shortness of breath  CARDIOVASCULAR: No chest pain or palpitations. No edema  GASTROINTESTINAL: No abdominal or epigastric pain. No nausea, vomiting, or hematemesis; No diarrhea or constipation. No melena or hematochezia.  GENITOURINARY: No dysuria, frequency, foamy urine, urinary urgency, incontinence or hematuria  NEUROLOGICAL: No numbness or weakness, No tremor  SKIN: No itching, burning, rashes, or lesions   VASCULAR: No claudication  Musculoskeletal: no arthralgia, no myalgia  All other review of systems is negative unless indicated above.    VITALS:  Vital Signs Last 24 Hrs  T(C): 37.5 (23 Mar 2020 06:22), Max: 38.6 (23 Mar 2020 01:26)  T(F): 99.5 (23 Mar 2020 06:22), Max: 101.5 (23 Mar 2020 01:26)  HR: 57 (23 Mar 2020 06:22) (57 - 80)  BP: 109/66 (23 Mar 2020 06:22) (109/66 - 138/79)  BP(mean): --  RR: 18 (23 Mar 2020 06:22) (18 - 18)  SpO2: 95% (23 Mar 2020 06:22) (95% - 97%)     @ 07:01  -   @ 07:00  --------------------------------------------------------  IN: 400 mL / OUT: 0 mL / NET: 400 mL      Height (cm): 172.72 ( @ 12:35)  Weight (kg): 93.4 ( @ 12:35)  BMI (kg/m2): 31.3 ( @ 12:35)  BSA (m2): 2.07 ( @ 12:35)    PHYSICAL EXAM:  Constitutional: NAD  HEENT: anicteric sclera, oropharynx clear, MMM  Neck: No JVD  Respiratory: good air entrance B/L, no wheezes, rales or rhonchi  Cardiovascular: S1, S2, RRR, no pericardial rub, no murmur  Gastrointestinal: BS+, soft, no tenderness, no distension, no bruit  Pelvis: bladder non-distended, no CVA tenderness  Extremities: No cyanosis or clubbing. No peripheral edema  Neurological: A/O x 3, no focal deficits  Psychiatric: Normal mood, normal affect  : No CVA tenderness. No ponce.   Skin: No rashes  Vascular: all pulses present  Access: Chief complain/HPI    History of Present Illness:  Reason for Admission: hematuria and fever	  History of Present Illness: 	  58 yo male with PMH of HTN presented to the ED for 1 week of fever upto 101-102, diarrhea, loss of appetite from past 5 days. He noticed slight blood in his urine x twice since yesterday. Denies any urinary frequency, urgency, hesitancy. Patient states that he took Motrin this morning prior to arrival at the ED. Patient otherwise denies any cough, shortness of breath, chest pain, and all other acute complaints. Patient notes that his wife is currently sick with a cough at home.  Patient has no c/o cough or sob  Still c/o diarrhea after each meal.  Able to drink water.    22-Mar-2020 14:00, Complete Blood Count + Automated Diff	  WBC Count: 5.68, [3.80 - 10.50 K/uL]	  RBC Count: 4.86, [4.20 - 5.80 M/uL]	  Hemoglobin: 15.2, [13.0 - 17.0 g/dL]	  Hematocrit: 43.1, [39.0 - 50.0 %]	  Mean Cell Volume: 88.7, [80.0 - 100.0 fl]	  Mean Cell Hemoglobin: 31.3, [27.0 - 34.0 pg]	  Mean Cell Hemoglobin Conc: 35.3, [32.0 - 36.0 gm/dL]	  Red Cell Distrib Width: 12.3, [10.3 - 14.5 %]	  Platelet Count - Automated: 158, [150 - 400 K/uL]	  Auto Neutrophil #: 3.92, [1.80 - 7.40 K/uL]	  Auto Lymphocyte #: 1.26, [1.00 - 3.30 K/uL]	  Auto Monocyte #: 0.48, [0.00 - 0.90 K/uL]	  Auto Eosinophil #: 0.00, [0.00 - 0.50 K/uL]	  Auto Basophil #: 0.01, [0.00 - 0.20 K/uL]	  Auto Neutrophil %: 68.9, [43.0 - 77.0 %], Differential percentages must be correlated with absolute numbers for  clinical significance.	    22-Mar-2020 14:00, Comprehensive Metabolic Panel	  Sodium, Serum:   134, [135 - 145 mmol/L]	  Potassium, Serum: 4.4, [3.5 - 5.3 mmol/L], Specimen is moderately hemolyzed, results may be affected	  Chloride, Serum: 98, [96 - 108 mmol/L]	  Carbon Dioxide, Serum: 24, [22 - 31 mmol/L]	  Anion Gap, Serum: 12, [5 - 17 mmol/L]	  Blood Urea Nitrogen, Serum:   46, [7 - 18 mg/dL]	  Creatinine, Serum:   2.39, [0.50 - 1.30 mg/dL]	  Glucose, Serum:   100, [70 - 99 mg/dL]	  Calcium, Total Serum: 8.4, [8.4 - 10.5 mg/dL]	  Protein Total, Serum:   8.5, [6.0 - 8.3 g/dL]	  Albumin, Serum: 3.7, [3.5 - 5.0 g/dL]	  Bilirubin Total, Serum: 0.6, [0.2 - 1.2 mg/dL]	  Alkaline Phosphatase, Serum: 41, [40 - 120 U/L]	  Aspartate Aminotransferase (AST/SGOT):   61, [10 - 40 U/L], Specimen is moderately hemolyzed, results may be affected	  Alanine Aminotransferase (ALT/SGPT): 49, [10 - 60 U/L DA]	  EGFR 28  Urine:	    22-Mar-2020 13:35, Urinalysis	  Color: Yellow, [Yellow]	  Urine Appearance: Clear, [Clear]	  Bilirubin: Negative, [Negative]	  Ketone - Urine: Negative, [Negative]	  Specific Gravity: 1.020, [1.010 - 1.025]	  Protein, Urine:   30, [Negative mg/dL]	  Urobilinogen: Negative, [Negative]	  Nitrite: Negative, [Negative]	  Leukocyte Esterase Concentration: Negative, [Negative]	  Blood, Urine:   Moderate, [Negative]	  Glucose Qualitative, Urine:   50, [Negative mg/dL]	  pH Urine: 5.0, [5.0 - 8.0]	    22-Mar-2020 13:35, Urine Microscopic-Add On (NC)	  Comment - Urine: Few Transitional Epithelial Cells	  Epithelial Cells: , [Negative /HPF], Occasional	  Red Blood Cell - Urine: 0-2, [0 - 2 /HPF]	  White Blood Cell - Urine: 3-5, [0 - 5 /HPF]	    ay Chest 2 Views PA/Lat: EXAM:  XR CHEST PA LAT 2V                        	  	  	PROCEDURE DATE:  2020    	  	  	  	INTERPRETATION:  Chest radiographs     CPT [id number]  	  	CLINICAL INFORMATION:  Patient is unable to communicate. Fever.  Short of breath.   	  	TECHNIQUE:  Frontal and lateral views of the chest were obtained.  	  	FINDINGS:  No previous examinations are available for review.  	  	The lungs demonstrate patchy airspace opacities in the left upper lobe and right lower lobe suspicious for multifocal pneumonia. No pleural effusion is seen. The heart and mediastinum appear intact.  	  	  	IMPRESSION:  Patchy airspace opacities noted in the left upper lobe and right lower lobe suspicious for multifocal pneumonia.  	    PAST MEDICAL & SURGICAL HISTORY:  HTN (hypertension)  H/O colectomy      Home Medications Reviewed  Home Medications:   * Patient Currently Takes Medications as of 22-Mar-2020 20:11 documented in Structured Notes  · 	hydrochlorothiazide-losartan:   · 	amLODIPine:       Hospital Medications:   MEDICATIONS  (STANDING):  amLODIPine   Tablet 5 milliGRAM(s) Oral daily  azithromycin  IVPB 500 milliGRAM(s) IV Intermittent every 24 hours  cefTRIAXone   IVPB 1000 milliGRAM(s) IV Intermittent every 24 hours  enoxaparin Injectable 40 milliGRAM(s) SubCutaneous daily  sodium chloride 0.9%. 1000 milliLiter(s) (200 mL/Hr) IV Continuous <Continuous>    MEDICATIONS  (PRN):  ALBUTerol    90 MICROgram(s) HFA Inhaler 2 Puff(s) Inhalation every 6 hours PRN Bronchospasm  guaiFENesin   Syrup  (Sugar-Free) 200 milliGRAM(s) Oral every 6 hours PRN Cough      Allergies    No Known Allergies    Intolerances                              15.2   5.68  )-----------( 158      ( 22 Mar 2020 14:00 )             43.1     03-22    134<L>  |  98  |  46<H>  ----------------------------<  100<H>  4.4   |  24  |  2.39<H>    Ca    8.4      22 Mar 2020 14:00    TPro  8.5<H>  /  Alb  3.7  /  TBili  0.6  /  DBili  x   /  AST  61<H>  /  ALT  49  /  AlkPhos  41  03-22      Urinalysis Basic - ( 22 Mar 2020 13:35 )    Color: Yellow / Appearance: Clear / S.020 / pH: x  Gluc: x / Ketone: Negative  / Bili: Negative / Urobili: Negative   Blood: x / Protein: 30 mg/dL / Nitrite: Negative   Leuk Esterase: Negative / RBC: 0-2 /HPF / WBC 3-5 /HPF   Sq Epi: x / Non Sq Epi: Occasional /HPF / Bacteria: x      Sodium, Random Urine: 48 mmol/L ( @ 06:55)  Creatinine, Random Urine: 118 mg/dL ( @ 06:55)        RADIOLOGY & ADDITIONAL STUDIES:  as above  SOCIAL HISTORY: Denies ETOh,Smoking,     FAMILY HISTORY:  No pertinent family history in first degree relatives      REVIEW OF SYSTEMS:  CONSTITUTIONAL: c/o malaise and fatigue    RESPIRATORY: No cough, wheezing, hemoptysis; No shortness of breath  CARDIOVASCULAR: No chest pain or palpitations. No edema  GASTROINTESTINAL: No abdominal or epigastric pain. Diarrhea with each meal  GENITOURINARY: No dysuria, frequency, foamy urine, urinary urgency, incontinence or hematuria      VITALS:  Vital Signs Last 24 Hrs  T(C): 37.5 (23 Mar 2020 06:22), Max: 38.6 (23 Mar 2020 01:26)  T(F): 99.5 (23 Mar 2020 06:22), Max: 101.5 (23 Mar 2020 01:26)  HR: 57 (23 Mar 2020 06:22) (57 - 80)  BP: 109/66 (23 Mar 2020 06:22) (109/66 - 138/79)  BP(mean): --  RR: 18 (23 Mar 2020 06:22) (18 - 18)  SpO2: 95% (23 Mar 2020 06:22) (95% - 97%)     @ 07:01  -   @ 07:00  --------------------------------------------------------  IN: 400 mL / OUT: 0 mL / NET: 400 mL      Height (cm): 172.72 ( @ 12:35)  Weight (kg): 93.4 ( @ 12:35)  BMI (kg/m2): 31.3 ( @ 12:35)  BSA (m2): 2.07 ( @ 12:35)    PHYSICAL EXAM:  Constitutional: NAD  Neck: No JVD  Respiratory: good air entrance B/L, no wheezes, rales or rhonchi  Cardiovascular: S1, S2, RRR, no pericardial rub, no murmur  Gastrointestinal: BS+, soft, no tenderness, no distension, no bruit  Pelvis: bladder non-distended, no CVA tenderness  Extremities: No cyanosis or clubbing. No peripheral edema

## 2020-03-23 NOTE — PROGRESS NOTE ADULT - PROBLEM SELECTOR PLAN 1
p/w fever, diarrhea   - Flu: neg   - CXR: multifocal PNA   Ed course; Rocephin and Azithro   - Will c/w Rocephin and Azithro to cover for CAP  - rule out covid 19; testing : contact and airborne isolation precaution   - f/u Blood culture   - Tylenol, Albuterol Inhaler, Robitussin PRN  - IV fluids

## 2020-03-24 DIAGNOSIS — E55.9 VITAMIN D DEFICIENCY, UNSPECIFIED: ICD-10-CM

## 2020-03-24 LAB
ANA TITR SER: NEGATIVE — SIGNIFICANT CHANGE UP
ANION GAP SERPL CALC-SCNC: 7 MMOL/L — SIGNIFICANT CHANGE UP (ref 5–17)
BASOPHILS # BLD AUTO: 0.01 K/UL — SIGNIFICANT CHANGE UP (ref 0–0.2)
BASOPHILS NFR BLD AUTO: 0.2 % — SIGNIFICANT CHANGE UP (ref 0–2)
BUN SERPL-MCNC: 16 MG/DL — SIGNIFICANT CHANGE UP (ref 7–18)
CALCIUM SERPL-MCNC: 7.8 MG/DL — LOW (ref 8.4–10.5)
CHLORIDE SERPL-SCNC: 108 MMOL/L — SIGNIFICANT CHANGE UP (ref 96–108)
CK SERPL-CCNC: 402 U/L — HIGH (ref 35–232)
CO2 SERPL-SCNC: 24 MMOL/L — SIGNIFICANT CHANGE UP (ref 22–31)
CREAT SERPL-MCNC: 0.98 MG/DL — SIGNIFICANT CHANGE UP (ref 0.5–1.3)
EOSINOPHIL # BLD AUTO: 0.01 K/UL — SIGNIFICANT CHANGE UP (ref 0–0.5)
EOSINOPHIL NFR BLD AUTO: 0.2 % — SIGNIFICANT CHANGE UP (ref 0–6)
GLUCOSE SERPL-MCNC: 91 MG/DL — SIGNIFICANT CHANGE UP (ref 70–99)
HCT VFR BLD CALC: 39.2 % — SIGNIFICANT CHANGE UP (ref 39–50)
HGB BLD-MCNC: 13.2 G/DL — SIGNIFICANT CHANGE UP (ref 13–17)
IMM GRANULOCYTES NFR BLD AUTO: 0.5 % — SIGNIFICANT CHANGE UP (ref 0–1.5)
LYMPHOCYTES # BLD AUTO: 1.06 K/UL — SIGNIFICANT CHANGE UP (ref 1–3.3)
LYMPHOCYTES # BLD AUTO: 26.2 % — SIGNIFICANT CHANGE UP (ref 13–44)
MAGNESIUM SERPL-MCNC: 2.4 MG/DL — SIGNIFICANT CHANGE UP (ref 1.6–2.6)
MCHC RBC-ENTMCNC: 30.1 PG — SIGNIFICANT CHANGE UP (ref 27–34)
MCHC RBC-ENTMCNC: 33.7 GM/DL — SIGNIFICANT CHANGE UP (ref 32–36)
MCV RBC AUTO: 89.5 FL — SIGNIFICANT CHANGE UP (ref 80–100)
MONOCYTES # BLD AUTO: 0.34 K/UL — SIGNIFICANT CHANGE UP (ref 0–0.9)
MONOCYTES NFR BLD AUTO: 8.4 % — SIGNIFICANT CHANGE UP (ref 2–14)
NEUTROPHILS # BLD AUTO: 2.61 K/UL — SIGNIFICANT CHANGE UP (ref 1.8–7.4)
NEUTROPHILS NFR BLD AUTO: 64.5 % — SIGNIFICANT CHANGE UP (ref 43–77)
NRBC # BLD: 0 /100 WBCS — SIGNIFICANT CHANGE UP (ref 0–0)
PHOSPHATE SERPL-MCNC: 1.6 MG/DL — LOW (ref 2.5–4.5)
PLATELET # BLD AUTO: 176 K/UL — SIGNIFICANT CHANGE UP (ref 150–400)
POTASSIUM SERPL-MCNC: 3.8 MMOL/L — SIGNIFICANT CHANGE UP (ref 3.5–5.3)
POTASSIUM SERPL-SCNC: 3.8 MMOL/L — SIGNIFICANT CHANGE UP (ref 3.5–5.3)
RBC # BLD: 4.38 M/UL — SIGNIFICANT CHANGE UP (ref 4.2–5.8)
RBC # FLD: 12.5 % — SIGNIFICANT CHANGE UP (ref 10.3–14.5)
SARS-COV-2 RNA SPEC QL NAA+PROBE: DETECTED
SODIUM SERPL-SCNC: 139 MMOL/L — SIGNIFICANT CHANGE UP (ref 135–145)
WBC # BLD: 4.05 K/UL — SIGNIFICANT CHANGE UP (ref 3.8–10.5)
WBC # FLD AUTO: 4.05 K/UL — SIGNIFICANT CHANGE UP (ref 3.8–10.5)

## 2020-03-24 RX ORDER — POTASSIUM PHOSPHATE, MONOBASIC POTASSIUM PHOSPHATE, DIBASIC 236; 224 MG/ML; MG/ML
30 INJECTION, SOLUTION INTRAVENOUS ONCE
Refills: 0 | Status: COMPLETED | OUTPATIENT
Start: 2020-03-24 | End: 2020-03-24

## 2020-03-24 RX ADMIN — Medication 650 MILLIGRAM(S): at 10:29

## 2020-03-24 RX ADMIN — Medication 650 MILLIGRAM(S): at 23:53

## 2020-03-24 RX ADMIN — SODIUM CHLORIDE 100 MILLILITER(S): 9 INJECTION, SOLUTION INTRAVENOUS at 00:15

## 2020-03-24 RX ADMIN — Medication 650 MILLIGRAM(S): at 00:14

## 2020-03-24 RX ADMIN — POTASSIUM PHOSPHATE, MONOBASIC POTASSIUM PHOSPHATE, DIBASIC 83.33 MILLIMOLE(S): 236; 224 INJECTION, SOLUTION INTRAVENOUS at 10:56

## 2020-03-24 RX ADMIN — AZITHROMYCIN 255 MILLIGRAM(S): 500 TABLET, FILM COATED ORAL at 13:33

## 2020-03-24 RX ADMIN — Medication 650 MILLIGRAM(S): at 05:15

## 2020-03-24 RX ADMIN — CEFTRIAXONE 100 MILLIGRAM(S): 500 INJECTION, POWDER, FOR SOLUTION INTRAMUSCULAR; INTRAVENOUS at 13:34

## 2020-03-24 RX ADMIN — Medication 650 MILLIGRAM(S): at 15:30

## 2020-03-24 RX ADMIN — Medication 1000 UNIT(S): at 11:01

## 2020-03-24 RX ADMIN — Medication 200 MILLIGRAM(S): at 05:14

## 2020-03-24 RX ADMIN — Medication 650 MILLIGRAM(S): at 14:46

## 2020-03-24 RX ADMIN — ENOXAPARIN SODIUM 40 MILLIGRAM(S): 100 INJECTION SUBCUTANEOUS at 11:01

## 2020-03-24 NOTE — PROGRESS NOTE ADULT - PROBLEM SELECTOR PLAN 1
p/w fever, diarrhea   - Flu: neg   - CXR: multifocal PNA   Ed course; Rocephin and Azithro   - Will c/w Rocephin and Azithro to cover for CAP  - Covid 19 positive: contact and airborne isolation precaution   - Blood culture negative   - Tylenol, Albuterol Inhaler, Robitussin PRN  - IV fluids for elevated CK; f/u nephro

## 2020-03-24 NOTE — PROGRESS NOTE ADULT - SUBJECTIVE AND OBJECTIVE BOX
Patient is a 59y old  Male who presents with a chief complaint of hematuria and fever (23 Mar 2020 09:42)    pt seen in tele [ x ], reg med floor [   ], bed [ x ], chair at bedside [   ], a+o x3 [x  ], lethargic [  ],  nad [ x ]      Allergies    No Known Allergies        Vitals    T(F): 99.2 (03-24-20 @ 08:25), Max: 101.8 (03-23-20 @ 20:08)  HR: 66 (03-24-20 @ 08:25) (64 - 81)  BP: 124/79 (03-24-20 @ 08:25) (113/69 - 133/64)  RR: 18 (03-24-20 @ 08:25) (18 - 20)  SpO2: 93% (03-24-20 @ 08:25) (93% - 99%)  Wt(kg): --  CAPILLARY BLOOD GLUCOSE          Labs                          13.2   4.05  )-----------( 176      ( 24 Mar 2020 07:41 )             39.2       03-24    139  |  108  |  16  ----------------------------<  91  3.8   |  24  |  0.98    Ca    7.8<L>      24 Mar 2020 07:41  Phos  1.6     03-24  Mg     2.4     03-24    TPro  6.9  /  Alb  3.0<L>  /  TBili  0.4  /  DBili  x   /  AST  43<H>  /  ALT  39  /  AlkPhos  33<L>  03-23      CARDIAC MARKERS ( 24 Mar 2020 07:41 )  x     / x     / 402 U/L / x     / x      CARDIAC MARKERS ( 23 Mar 2020 10:46 )  x     / x     / 511 U/L / x     / x      CARDIAC MARKERS ( 22 Mar 2020 20:22 )  x     / x     / 602 U/L / x     / x            .Urine  03-22 @ 21:59   No growth  --  --          Radiology Results      Meds    MEDICATIONS  (STANDING):  azithromycin  IVPB 500 milliGRAM(s) IV Intermittent every 24 hours  cefTRIAXone   IVPB 1000 milliGRAM(s) IV Intermittent every 24 hours  cholecalciferol 1000 Unit(s) Oral daily  enoxaparin Injectable 40 milliGRAM(s) SubCutaneous daily  lactated ringers 1000 milliLiter(s) (100 mL/Hr) IV Continuous <Continuous>  potassium phosphate IVPB 30 milliMole(s) IV Intermittent once      MEDICATIONS  (PRN):  acetaminophen   Tablet .. 650 milliGRAM(s) Oral every 6 hours PRN Temp greater or equal to 38C (100.4F), Moderate Pain (4 - 6)  ALBUTerol    90 MICROgram(s) HFA Inhaler 2 Puff(s) Inhalation every 6 hours PRN Bronchospasm  guaiFENesin   Syrup  (Sugar-Free) 200 milliGRAM(s) Oral every 6 hours PRN Cough      Physical Exam        Neuro :  no focal deficits  Respiratory: CTA B/L  CV: RRR, S1S2, no murmurs,   Abdominal: Soft, NT, ND +BS,  Extremities: No edema, + peripheral pulses    ASSESSMENT    multifocal Pneumonia due to organism   r/o covid 19   mild rhabdomyolysis  juno  s/p hyponatremia  h/o HTN (hypertension)  H/O colectomy        PLAN      cont roceph and zithromax  f/u bld cx    rsv neg noted above  f/u covid -19 results  contact and airborne isolation  cont albuterol inhaler   f/u procalcitonin, legionella Ag, strep, mycoplasma Ag  F/u aptt, inr, D-dimer, esr, crp, ldh, ferritin, lactate, t cell subset  cont supportive care with tylenol prn, robitussin prn and O2 via nasal canula if needed  pulm cons   ct abd with Mural thickening of the urinary bladder, possibly due to underdistention. Underlying cystitis is not excluded noted above.   doubt hematuria at present as ua neg for rbc's or infectious process  elevated ck improving noted above  cont ivf  serun creat improving   renal f/u  cont current meds Patient is a 59y old  Male who presents with a chief complaint of hematuria and fever (23 Mar 2020 09:42)    pt seen in tele [ x ], reg med floor [   ], bed [ x ], chair at bedside [   ], a+o x3 [x  ], lethargic [  ],  nad [ x ]      Allergies    No Known Allergies        Vitals    T(F): 99.2 (03-24-20 @ 08:25), Max: 101.8 (03-23-20 @ 20:08)  HR: 66 (03-24-20 @ 08:25) (64 - 81)  BP: 124/79 (03-24-20 @ 08:25) (113/69 - 133/64)  RR: 18 (03-24-20 @ 08:25) (18 - 20)  SpO2: 93% (03-24-20 @ 08:25) (93% - 99%)  Wt(kg): --  CAPILLARY BLOOD GLUCOSE          Labs                          13.2   4.05  )-----------( 176      ( 24 Mar 2020 07:41 )             39.2       03-24    139  |  108  |  16  ----------------------------<  91  3.8   |  24  |  0.98    Ca    7.8<L>      24 Mar 2020 07:41  Phos  1.6     03-24  Mg     2.4     03-24    TPro  6.9  /  Alb  3.0<L>  /  TBili  0.4  /  DBili  x   /  AST  43<H>  /  ALT  39  /  AlkPhos  33<L>  03-23      Procalcitonin, Serum (03.23.20 @ 14:19)    Procalcitonin, Serum: 0.08: Procalcitonin (PCT) Interpretation (ng/mL) - Diagnosis of systemic  bacterial infection/sepsis  PCT < 0.5: Systemic infection (sepsis) is not likely and risk for  progression to severe systemic infection is low.        CARDIAC MARKERS ( 24 Mar 2020 07:41 )  x     / x     / 402 U/L / x     / x      CARDIAC MARKERS ( 23 Mar 2020 10:46 )  x     / x     / 511 U/L / x     / x      CARDIAC MARKERS ( 22 Mar 2020 20:22 )  x     / x     / 602 U/L / x     / x            .Urine  03-22 @ 21:59   No growth  --  --    Culture - Blood (03.22.20 @ 21:59)    Specimen Source: .Blood    Culture Results:   No growth to date.          Radiology Results      Meds    MEDICATIONS  (STANDING):  azithromycin  IVPB 500 milliGRAM(s) IV Intermittent every 24 hours  cefTRIAXone   IVPB 1000 milliGRAM(s) IV Intermittent every 24 hours  cholecalciferol 1000 Unit(s) Oral daily  enoxaparin Injectable 40 milliGRAM(s) SubCutaneous daily  lactated ringers 1000 milliLiter(s) (100 mL/Hr) IV Continuous <Continuous>  potassium phosphate IVPB 30 milliMole(s) IV Intermittent once      MEDICATIONS  (PRN):  acetaminophen   Tablet .. 650 milliGRAM(s) Oral every 6 hours PRN Temp greater or equal to 38C (100.4F), Moderate Pain (4 - 6)  ALBUTerol    90 MICROgram(s) HFA Inhaler 2 Puff(s) Inhalation every 6 hours PRN Bronchospasm  guaiFENesin   Syrup  (Sugar-Free) 200 milliGRAM(s) Oral every 6 hours PRN Cough      Physical Exam        Neuro :  no focal deficits  Respiratory: CTA B/L  CV: RRR, S1S2, no murmurs,   Abdominal: Soft, NT, ND +BS,  Extremities: No edema, + peripheral pulses    ASSESSMENT    multifocal Pneumonia due to organism   r/o covid 19   mild rhabdomyolysis  juno  s/p hyponatremia  h/o HTN (hypertension)  H/O colectomy        PLAN      cont roceph and zithromax  rsv neg noted above  covid -19 positive   tmx 101.8  O2 sat on ra 93%  contact and airborne isolation  cont albuterol inhaler   procalcitonin neg noted above  cont supportive care with tylenol prn, robitussin prn and O2 via nasal canula if needed   blood and ucx neg  pulm f/u   ct abd with Mural thickening of the urinary bladder, possibly due to underdistention. Underlying cystitis is not excluded noted above.   doubt hematuria at present as ua neg for rbc's or infectious process  elevated ck improving noted above  cont ivf  serun creat improving   renal f/u  cont current meds Patient is a 59y old  Male who presents with a chief complaint of hematuria and fever (23 Mar 2020 09:42)    pt seen in tele [ x ], reg med floor [   ], bed [ x ], chair at bedside [   ], a+o x3 [x  ], lethargic [  ],  nad [ x ]      Allergies    No Known Allergies        Vitals    T(F): 99.2 (03-24-20 @ 08:25), Max: 101.8 (03-23-20 @ 20:08)  HR: 66 (03-24-20 @ 08:25) (64 - 81)  BP: 124/79 (03-24-20 @ 08:25) (113/69 - 133/64)  RR: 18 (03-24-20 @ 08:25) (18 - 20)  SpO2: 93% (03-24-20 @ 08:25) (93% - 99%)  Wt(kg): --  CAPILLARY BLOOD GLUCOSE          Labs                          13.2   4.05  )-----------( 176      ( 24 Mar 2020 07:41 )             39.2       03-24    139  |  108  |  16  ----------------------------<  91  3.8   |  24  |  0.98    Ca    7.8<L>      24 Mar 2020 07:41  Phos  1.6     03-24  Mg     2.4     03-24    TPro  6.9  /  Alb  3.0<L>  /  TBili  0.4  /  DBili  x   /  AST  43<H>  /  ALT  39  /  AlkPhos  33<L>  03-23      Procalcitonin, Serum (03.23.20 @ 14:19)    Procalcitonin, Serum: 0.08: Procalcitonin (PCT) Interpretation (ng/mL) - Diagnosis of systemic  bacterial infection/sepsis  PCT < 0.5: Systemic infection (sepsis) is not likely and risk for  progression to severe systemic infection is low.        CARDIAC MARKERS ( 24 Mar 2020 07:41 )  x     / x     / 402 U/L / x     / x      CARDIAC MARKERS ( 23 Mar 2020 10:46 )  x     / x     / 511 U/L / x     / x      CARDIAC MARKERS ( 22 Mar 2020 20:22 )  x     / x     / 602 U/L / x     / x            .Urine  03-22 @ 21:59   No growth  --  --    Culture - Blood (03.22.20 @ 21:59)    Specimen Source: .Blood    Culture Results:   No growth to date.          Radiology Results      Meds    MEDICATIONS  (STANDING):  azithromycin  IVPB 500 milliGRAM(s) IV Intermittent every 24 hours  cefTRIAXone   IVPB 1000 milliGRAM(s) IV Intermittent every 24 hours  cholecalciferol 1000 Unit(s) Oral daily  enoxaparin Injectable 40 milliGRAM(s) SubCutaneous daily  lactated ringers 1000 milliLiter(s) (100 mL/Hr) IV Continuous <Continuous>  potassium phosphate IVPB 30 milliMole(s) IV Intermittent once      MEDICATIONS  (PRN):  acetaminophen   Tablet .. 650 milliGRAM(s) Oral every 6 hours PRN Temp greater or equal to 38C (100.4F), Moderate Pain (4 - 6)  ALBUTerol    90 MICROgram(s) HFA Inhaler 2 Puff(s) Inhalation every 6 hours PRN Bronchospasm  guaiFENesin   Syrup  (Sugar-Free) 200 milliGRAM(s) Oral every 6 hours PRN Cough      Physical Exam        Neuro :  no focal deficits  Respiratory: CTA B/L  CV: RRR, S1S2, no murmurs,   Abdominal: Soft, NT, ND +BS,  Extremities: No edema, + peripheral pulses    ASSESSMENT    multifocal Pneumonia due to organism   r/o covid 19   mild rhabdomyolysis  juno  s/p hyponatremia  h/o HTN (hypertension)  H/O colectomy        PLAN      cont roceph and zithromax  rsv neg noted above  covid -19 positive   tmx 101.8  O2 sat on ra 93%  contact and airborne isolation  cont albuterol inhaler   procalcitonin neg noted above  cont supportive care with tylenol prn, robitussin prn and O2 via nasal canula if needed   blood and ucx neg  pulm f/u   ct abd with Mural thickening of the urinary bladder, possibly due to underdistention. Underlying cystitis is not excluded noted above.   doubt hematuria at present as ua neg for rbc's or infectious process  elevated ck improving noted above  d/c ivf  serun creat wnl noted above  renal f/u   supplement phos for hypophosphatemia  cont current meds Patient is a 59y old  Male who presents with a chief complaint of hematuria and fever (23 Mar 2020 09:42)    pt seen in tele [ x ], reg med floor [   ], bed [ x ], chair at bedside [   ], a+o x3 [x  ], lethargic [  ],  nad [ x ]     pt with diarrhea      Allergies    No Known Allergies        Vitals    T(F): 99.2 (03-24-20 @ 08:25), Max: 101.8 (03-23-20 @ 20:08)  HR: 66 (03-24-20 @ 08:25) (64 - 81)  BP: 124/79 (03-24-20 @ 08:25) (113/69 - 133/64)  RR: 18 (03-24-20 @ 08:25) (18 - 20)  SpO2: 93% (03-24-20 @ 08:25) (93% - 99%)  Wt(kg): --  CAPILLARY BLOOD GLUCOSE          Labs                          13.2   4.05  )-----------( 176      ( 24 Mar 2020 07:41 )             39.2       03-24    139  |  108  |  16  ----------------------------<  91  3.8   |  24  |  0.98    Ca    7.8<L>      24 Mar 2020 07:41  Phos  1.6     03-24  Mg     2.4     03-24    TPro  6.9  /  Alb  3.0<L>  /  TBili  0.4  /  DBili  x   /  AST  43<H>  /  ALT  39  /  AlkPhos  33<L>  03-23      Procalcitonin, Serum (03.23.20 @ 14:19)    Procalcitonin, Serum: 0.08: Procalcitonin (PCT) Interpretation (ng/mL) - Diagnosis of systemic  bacterial infection/sepsis  PCT < 0.5: Systemic infection (sepsis) is not likely and risk for  progression to severe systemic infection is low.        CARDIAC MARKERS ( 24 Mar 2020 07:41 )  x     / x     / 402 U/L / x     / x      CARDIAC MARKERS ( 23 Mar 2020 10:46 )  x     / x     / 511 U/L / x     / x      CARDIAC MARKERS ( 22 Mar 2020 20:22 )  x     / x     / 602 U/L / x     / x            .Urine  03-22 @ 21:59   No growth  --  --    Culture - Blood (03.22.20 @ 21:59)    Specimen Source: .Blood    Culture Results:   No growth to date.          Radiology Results      Meds    MEDICATIONS  (STANDING):  azithromycin  IVPB 500 milliGRAM(s) IV Intermittent every 24 hours  cefTRIAXone   IVPB 1000 milliGRAM(s) IV Intermittent every 24 hours  cholecalciferol 1000 Unit(s) Oral daily  enoxaparin Injectable 40 milliGRAM(s) SubCutaneous daily  lactated ringers 1000 milliLiter(s) (100 mL/Hr) IV Continuous <Continuous>  potassium phosphate IVPB 30 milliMole(s) IV Intermittent once      MEDICATIONS  (PRN):  acetaminophen   Tablet .. 650 milliGRAM(s) Oral every 6 hours PRN Temp greater or equal to 38C (100.4F), Moderate Pain (4 - 6)  ALBUTerol    90 MICROgram(s) HFA Inhaler 2 Puff(s) Inhalation every 6 hours PRN Bronchospasm  guaiFENesin   Syrup  (Sugar-Free) 200 milliGRAM(s) Oral every 6 hours PRN Cough      Physical Exam        Neuro :  no focal deficits  Respiratory: CTA B/L  CV: RRR, S1S2, no murmurs,   Abdominal: Soft, NT, ND +BS,  Extremities: No edema, + peripheral pulses    ASSESSMENT    multifocal Pneumonia due to organism   r/o covid 19   mild rhabdomyolysis  juno  s/p hyponatremia  h/o HTN (hypertension)  H/O colectomy        PLAN      cont roceph and zithromax  rsv neg noted above  covid -19 positive   tmx 101.8  O2 sat on ra 93%  contact and airborne isolation  cont albuterol inhaler   procalcitonin neg noted above  cont supportive care with tylenol prn, robitussin prn and O2 via nasal canula if needed   blood and ucx neg  pulm f/u   ct abd with Mural thickening of the urinary bladder, possibly due to underdistention. Underlying cystitis is not excluded noted above.   doubt hematuria at present as ua neg for rbc's or infectious process  elevated ck improving noted above  d/c ivf  serun creat wnl noted above  renal f/u   supplement phos for hypophosphatemia  cont current meds

## 2020-03-24 NOTE — PROGRESS NOTE ADULT - PROBLEM SELECTOR PLAN 4
- home med: Amlodipine , Losartan - HCTZ , not sure of the dose.   - Hold Losartan- HCTZ in setting of HANNAH   - hold BP meds  - Monitor BP  -BP currently stable

## 2020-03-24 NOTE — DISCHARGE NOTE PROVIDER - NSDCMRMEDTOKEN_GEN_ALL_CORE_FT
amLODIPine:   hydrochlorothiazide-losartan: amLODIPine:   ascorbic acid 500 mg oral tablet: 1 tab(s) orally 2 times a day  cholecalciferol 1000 intl units (25 mcg) oral tablet: 1 tab(s) orally once a day   hydrochlorothiazide-losartan:   thiamine 100 mg oral tablet: 1 tab(s) orally once a day

## 2020-03-24 NOTE — PROGRESS NOTE ADULT - ASSESSMENT
Assessment and Recommendation:   Problem/Recommendation - 1:  Problem: Covid-19 + Recommendation: with B/L PNA.   Continue antibiotics  oxygen supp prn  monitor SpO2.  monitor temp and WBC.  Fluids for dehydration 2nd to diarrhea and fever.   Albuterol inhaler PRN.   Tylenol and Robitussin PRN   Isolation precautions    Problem/Recommendation - 2:  ·  Problem: HTN (hypertension).  Recommendation: monitor BP  cont meds.     Problem/Recommendation - 3:  ·  Problem: HANNAH (acute kidney injury).  Recommendation: Avoid nephrotoxic drugs  monitor BMP  IVF  CT abdomen/pelvis noted - urinary bladder mural thickening.   Renal eval. Assessment and Recommendation:   Problem/Recommendation - 1:  Problem: Covid-19 + Recommendation: with B/L PNA.   Continue antibiotics  oxygen supp prn  monitor SpO2.  monitor temp and WBC.  Fluids for dehydration 2nd to diarrhea and fever.   Albuterol inhaler PRN.   Tylenol and Robitussin PRN   Isolation precautions    Problem/Recommendation - 2:  ·  Problem: HTN (hypertension).  Recommendation: monitor BP  cont meds.     Problem/Recommendation - 3:  ·  Problem: HANNAH (acute kidney injury).  Recommendation: Avoid nephrotoxic drugs  monitor BMP  IVF  CT abdomen/pelvis noted - urinary bladder mural thickening.    eval

## 2020-03-24 NOTE — PROGRESS NOTE ADULT - SUBJECTIVE AND OBJECTIVE BOX
PGY 1 Note discussed with supervising resident and primary attending    Patient is a 59y old  Male who presents with a chief complaint of hematuria and fever (24 Mar 2020 09:54)    INTERVAL HPI/OVERNIGHT EVENTS: Patient seen and examined at the bedside. Patient tested positive for COVID. Has started having fevers. Saturating at 93% on room air. Patient denies any cough or shortness of breath. Creatinine currently within normal limits.     MEDICATIONS  (STANDING):  azithromycin  IVPB 500 milliGRAM(s) IV Intermittent every 24 hours  cefTRIAXone   IVPB 1000 milliGRAM(s) IV Intermittent every 24 hours  cholecalciferol 1000 Unit(s) Oral daily  enoxaparin Injectable 40 milliGRAM(s) SubCutaneous daily  lactated ringers 1000 milliLiter(s) (100 mL/Hr) IV Continuous <Continuous>    MEDICATIONS  (PRN):  acetaminophen   Tablet .. 650 milliGRAM(s) Oral every 6 hours PRN Temp greater or equal to 38C (100.4F), Moderate Pain (4 - 6)  ALBUTerol    90 MICROgram(s) HFA Inhaler 2 Puff(s) Inhalation every 6 hours PRN Bronchospasm  guaiFENesin   Syrup  (Sugar-Free) 200 milliGRAM(s) Oral every 6 hours PRN Cough      __________________________________________________  REVIEW OF SYSTEMS:  CONSTITUTIONAL: Fever  EYES: no visual disturbances  NECK: No pain  RESPIRATORY: No cough; No shortness of breath  CARDIOVASCULAR: No chest pain  GASTROINTESTINAL: No pain. No nausea or vomiting   NEUROLOGICAL: No headache  MUSCULOSKELETAL: No joint pain, no muscle pain  GENITOURINARY: no dysuria  PSYCHIATRY: no anxiety  ALL OTHER  ROS negative        Vital Signs Last 24 Hrs  T(C): 37.3 (24 Mar 2020 08:25), Max: 38.8 (23 Mar 2020 20:08)  T(F): 99.2 (24 Mar 2020 08:25), Max: 101.8 (23 Mar 2020 20:08)  HR: 66 (24 Mar 2020 08:25) (64 - 81)  BP: 124/79 (24 Mar 2020 08:25) (113/69 - 133/64)  RR: 18 (24 Mar 2020 08:25) (18 - 20)  SpO2: 93% (24 Mar 2020 08:25) (93% - 99%)    ________________________________________________  PHYSICAL EXAM:  GENERAL: Patient seen resting in bed and in no acute distress  HEENT: Normocephalic; conjunctivae and sclerae clear   NECK: supple  CHEST/LUNG: Clear to auscultation   HEART: S1 S2, regular; no murmurs  ABDOMEN: Soft, Nontender, Nondistended; Bowel sounds present  EXTREMITIES: no edema; no calf tenderness  SKIN: warm and dry  NERVOUS SYSTEM: Awake and alert; Oriented to place, person and time     _________________________________________________  LABS:                        13.2   4.05  )-----------( 176      ( 24 Mar 2020 07:41 )             39.2     03-24    139  |  108  |  16  ----------------------------<  91  3.8   |  24  |  0.98    Ca    7.8<L>      24 Mar 2020 07:41  Phos  1.6     03-24  Mg     2.4     03-24    TPro  6.9  /  Alb  3.0<L>  /  TBili  0.4  /  DBili  x   /  AST  43<H>  /  ALT  39  /  AlkPhos  33<L>  03-23      Urinalysis Basic - ( 22 Mar 2020 13:35 )    Color: Yellow / Appearance: Clear / S.020 / pH: x  Gluc: x / Ketone: Negative  / Bili: Negative / Urobili: Negative   Blood: x / Protein: 30 mg/dL / Nitrite: Negative   Leuk Esterase: Negative / RBC: 0-2 /HPF / WBC 3-5 /HPF   Sq Epi: x / Non Sq Epi: Occasional /HPF / Bacteria: x      RADIOLOGY & ADDITIONAL TESTS:    Imaging Personally Reviewed:  YES    No new imaging     Consultant(s) Notes Reviewed:   YES    Care Discussed with Consultants :     Plan of care was discussed with patient and /or primary care giver; all questions and concerns were addressed and care was aligned with patient's wishes.

## 2020-03-24 NOTE — DISCHARGE NOTE PROVIDER - HOSPITAL COURSE
Patient is a 59 year old male, with PMH of HTN, who presented to the ED for 1 week of fever up to 101-102, diarrhea, and loss of appetite for 5 days. He noticed slight blood in his urine twice  day prior to admission.         Admitted for rule out COVID.         Flu and RSV negative. Chest x-ray showed multifocal pneumonia. Started on ceftriaxone and azithromycin antibiotics. COVID tested positive. Noted Patient is a 59 year old male, with PMH of HTN, who presented to the ED for 1 week of fever up to 101-102, diarrhea, and loss of appetite for 5 days. He noticed slight blood in his urine twice  day prior to admission.         Admitted for rule out COVID.         Flu and RSV negative. Chest x-ray showed multifocal pneumonia. Started on ceftriaxone and azithromycin antibiotics. COVID tested positive. Noted to have elevated creatinine of 2.5 on admission. UA noted to have    blood but no RBCs. Creatine kinase noted to be elevated at 602. Nephrology, Dr. Fowler consuled. Patient treated with IV fluids. CT abdomen/pelvis done showed mural wall thickening of bladder wall, possibly    due to underdistention.         NOTE NOT COMPLETE Patient is a 59 year old male, with PMH of HTN, who presented to the ED for 1 week of fever up to 101-102, diarrhea, and loss of appetite for 5 days. He noticed slight blood in his urine twice  day prior to admission.         Admitted for rule out COVID.         Flu and RSV negative. Chest x-ray showed multifocal pneumonia. Started on ceftriaxone and azithromycin antibiotics. COVID tested positive. Noted to have elevated creatinine of 2.5 on admission. UA noted to have    blood but no RBCs. Creatine kinase noted to be elevated at 602. Nephrology, Dr. Fowler consuled. Patient treated with IV fluids. CT abdomen/pelvis done showed mural wall thickening of bladder wall, possibly    due to underdistention. COVID resulted positive on 3/22/2020.         NOTE NOT COMPLETE Patient is a 59 year old male, with PMH of HTN, who presented to the ED for 1 week of fever up to 101-102, diarrhea, and loss of appetite for 5 days. He noticed slight blood in his urine twice the day prior to admission.         Admitted for rule out COVID.         Flu and RSV negative. Chest x-ray showed multifocal pneumonia. Started on ceftriaxone and azithromycin antibiotics. COVID tested positive. Noted to have elevated creatinine of 2.5 on admission. UA noted to have    blood but no RBCs. Creatine kinase noted to be elevated at 602. Nephrology, Dr. Fowler consuled. Patient treated with IV fluids. CT abdomen/pelvis done showed mural wall thickening of bladder wall, possibly    due to underdistention. Creatinine returned to normal limits. COVID resulted positive on 3/22/2020. Patient noted to slowly desaturate on room air. Patient started on hydroxychloroquine and placed on nasal cannula. Repeat CXR showed worsening infiltrates.           NOTE NOT COMPLETE Patient is a 59 year old male, with PMH of HTN, who presented to the ED for 1 week of fever up to 101-102, diarrhea, and loss of appetite for 5 days. He noticed slight blood in his urine twice the day prior to admission.         Admitted for rule out COVID.         Flu and RSV negative. Chest x-ray showed multifocal pneumonia. Started on ceftriaxone and azithromycin antibiotics. COVID tested positive. Noted to have elevated creatinine of 2.5 on admission. UA noted to have    blood but no RBCs. Creatine kinase noted to be elevated at 602. Nephrology, Dr. Fowler consuled. Patient treated with IV fluids. CT abdomen/pelvis done showed mural wall thickening of bladder wall, possibly    due to underdistention. Creatinine returned to normal limits. COVID resulted positive on 3/22/2020. Patient noted to slowly desaturate on room air. Patient started on hydroxychloroquine and placed on nasal cannula. Repeat CXR showed worsening infiltrates. Treated with vitamin C, thiamine and zinc. Patient clinically improved and was saturating well on room air. Completed 5 day course of plaquenil.        Patient is medically stable for discharge. Case was discussed with attending.

## 2020-03-24 NOTE — PROGRESS NOTE ADULT - SUBJECTIVE AND OBJECTIVE BOX
Pt is awake, alert, lying in bed in NAD. SpO2 on RA 93%.     INTERVAL HPI/OVERNIGHT EVENTS:      VITAL SIGNS:  T(F): 99.2 (20 @ 08:25)  HR: 66 (20 @ 08:25)  BP: 124/79 (20 @ 08:25)  RR: 18 (20 @ 08:25)  SpO2: 93% (20 @ 08:25)  Wt(kg): --  I&O's Detail      REVIEW OF SYSTEMS:    CONSTITUTIONAL:  No fevers, chills, sweats    HEENT:  Eyes:  No diplopia or blurred vision. ENT:  No earache, sore throat or runny nose.    CARDIOVASCULAR:  No pressure, squeezing, tightness, or heaviness about the chest; no palpitations.    RESPIRATORY:  Per HPI    GASTROINTESTINAL:  No abdominal pain, nausea, vomiting or diarrhea.    GENITOURINARY:  No dysuria, frequency or urgency.    NEUROLOGIC:  No paresthesias, fasciculations, seizures or weakness.    PSYCHIATRIC:  No disorder of thought or mood.      PHYSICAL EXAM:    Constitutional: Well developed and nourished  Eyes:Perrla  ENMT: normal  Neck:supple  Respiratory: good air entry  Cardiovascular: S1 S2 regular  Gastrointestinal: Soft, Non tender  Extremities: No edema  Vascular:normal  Neurological:Awake, alert,Ox3  Musculoskeletal:Normal      MEDICATIONS  (STANDING):  azithromycin  IVPB 500 milliGRAM(s) IV Intermittent every 24 hours  cefTRIAXone   IVPB 1000 milliGRAM(s) IV Intermittent every 24 hours  cholecalciferol 1000 Unit(s) Oral daily  enoxaparin Injectable 40 milliGRAM(s) SubCutaneous daily  lactated ringers 1000 milliLiter(s) (100 mL/Hr) IV Continuous <Continuous>  potassium phosphate IVPB 30 milliMole(s) IV Intermittent once    MEDICATIONS  (PRN):  acetaminophen   Tablet .. 650 milliGRAM(s) Oral every 6 hours PRN Temp greater or equal to 38C (100.4F), Moderate Pain (4 - 6)  ALBUTerol    90 MICROgram(s) HFA Inhaler 2 Puff(s) Inhalation every 6 hours PRN Bronchospasm  guaiFENesin   Syrup  (Sugar-Free) 200 milliGRAM(s) Oral every 6 hours PRN Cough      Allergies    No Known Allergies    Intolerances        LABS:                        13.2   4.05  )-----------( 176      ( 24 Mar 2020 07:41 )             39.2     03-24    139  |  108  |  16  ----------------------------<  91  3.8   |  24  |  0.98    Ca    7.8<L>      24 Mar 2020 07:41  Phos  1.6     03-24  Mg     2.4     03-24    TPro  6.9  /  Alb  3.0<L>  /  TBili  0.4  /  DBili  x   /  AST  43<H>  /  ALT  39  /  AlkPhos  33<L>        Urinalysis Basic - ( 22 Mar 2020 13:35 )    Color: Yellow / Appearance: Clear / S.020 / pH: x  Gluc: x / Ketone: Negative  / Bili: Negative / Urobili: Negative   Blood: x / Protein: 30 mg/dL / Nitrite: Negative   Leuk Esterase: Negative / RBC: 0-2 /HPF / WBC 3-5 /HPF   Sq Epi: x / Non Sq Epi: Occasional /HPF / Bacteria: x        CARDIAC MARKERS ( 24 Mar 2020 07:41 )  x     / x     / 402 U/L / x     / x      CARDIAC MARKERS ( 23 Mar 2020 10:46 )  x     / x     / 511 U/L / x     / x      CARDIAC MARKERS ( 22 Mar 2020 20:22 )  x     / x     / 602 U/L / x     / x          CAPILLARY BLOOD GLUCOSE            RADIOLOGY & ADDITIONAL TESTS:    CXR:  < from: Xray Chest 2 Views PA/Lat (20 @ 15:38) >  IMPRESSION:  Patchy airspace opacities noted in the left upper lobe and right lower lobe suspicious for multifocal pneumonia.      < end of copied text >    Ct scan chest:  < from: CT Abdomen and Pelvis No Cont (20 @ 15:27) >  IMPRESSION:     Mural thickening of the urinary bladder, possibly due to underdistention. Underlying cystitis is not excluded.      < end of copied text >    ekg;    echo: Pt is awake, alert, lying in bed in NAD. SpO2 on RA 93%. Still with diarrhea. Denies respiratory symptoms.    INTERVAL HPI/OVERNIGHT EVENTS:      VITAL SIGNS:  T(F): 99.2 (20 @ 08:25)  HR: 66 (20 @ 08:25)  BP: 124/79 (20 @ 08:25)  RR: 18 (20 @ 08:25)  SpO2: 93% (20 @ 08:25)  Wt(kg): --  I&O's Detail      REVIEW OF SYSTEMS:    CONSTITUTIONAL:  No fevers, chills, sweats    HEENT:  Eyes:  No diplopia or blurred vision. ENT:  No earache, sore throat or runny nose.    CARDIOVASCULAR:  No pressure, squeezing, tightness, or heaviness about the chest; no palpitations.    RESPIRATORY:  Per HPI    GASTROINTESTINAL:  No abdominal pain, nausea, vomiting or diarrhea.    GENITOURINARY:  No dysuria, frequency or urgency.    NEUROLOGIC:  No paresthesias, fasciculations, seizures or weakness.    PSYCHIATRIC:  No disorder of thought or mood.      PHYSICAL EXAM:    Constitutional: Well developed and nourished  Eyes:Perrla  ENMT: normal  Neck:supple  Respiratory: good air entry  Cardiovascular: S1 S2 regular  Gastrointestinal: Soft, Non tender  Extremities: No edema  Vascular:normal  Neurological:Awake, alert,Ox3  Musculoskeletal:Normal      MEDICATIONS  (STANDING):  azithromycin  IVPB 500 milliGRAM(s) IV Intermittent every 24 hours  cefTRIAXone   IVPB 1000 milliGRAM(s) IV Intermittent every 24 hours  cholecalciferol 1000 Unit(s) Oral daily  enoxaparin Injectable 40 milliGRAM(s) SubCutaneous daily  lactated ringers 1000 milliLiter(s) (100 mL/Hr) IV Continuous <Continuous>  potassium phosphate IVPB 30 milliMole(s) IV Intermittent once    MEDICATIONS  (PRN):  acetaminophen   Tablet .. 650 milliGRAM(s) Oral every 6 hours PRN Temp greater or equal to 38C (100.4F), Moderate Pain (4 - 6)  ALBUTerol    90 MICROgram(s) HFA Inhaler 2 Puff(s) Inhalation every 6 hours PRN Bronchospasm  guaiFENesin   Syrup  (Sugar-Free) 200 milliGRAM(s) Oral every 6 hours PRN Cough      Allergies    No Known Allergies    Intolerances        LABS:                        13.2   4.05  )-----------( 176      ( 24 Mar 2020 07:41 )             39.2     03-24    139  |  108  |  16  ----------------------------<  91  3.8   |  24  |  0.98    Ca    7.8<L>      24 Mar 2020 07:41  Phos  1.6     03-24  Mg     2.4     03-24    TPro  6.9  /  Alb  3.0<L>  /  TBili  0.4  /  DBili  x   /  AST  43<H>  /  ALT  39  /  AlkPhos  33<L>  03-23      Urinalysis Basic - ( 22 Mar 2020 13:35 )    Color: Yellow / Appearance: Clear / S.020 / pH: x  Gluc: x / Ketone: Negative  / Bili: Negative / Urobili: Negative   Blood: x / Protein: 30 mg/dL / Nitrite: Negative   Leuk Esterase: Negative / RBC: 0-2 /HPF / WBC 3-5 /HPF   Sq Epi: x / Non Sq Epi: Occasional /HPF / Bacteria: x        CARDIAC MARKERS ( 24 Mar 2020 07:41 )  x     / x     / 402 U/L / x     / x      CARDIAC MARKERS ( 23 Mar 2020 10:46 )  x     / x     / 511 U/L / x     / x      CARDIAC MARKERS ( 22 Mar 2020 20:22 )  x     / x     / 602 U/L / x     / x          CAPILLARY BLOOD GLUCOSE            RADIOLOGY & ADDITIONAL TESTS:    CXR:  < from: Xray Chest 2 Views PA/Lat (20 @ 15:38) >  IMPRESSION:  Patchy airspace opacities noted in the left upper lobe and right lower lobe suspicious for multifocal pneumonia.      < end of copied text >    Ct scan chest:  < from: CT Abdomen and Pelvis No Cont (20 @ 15:27) >  IMPRESSION:     Mural thickening of the urinary bladder, possibly due to underdistention. Underlying cystitis is not excluded.      < end of copied text >    ekg;    echo:

## 2020-03-24 NOTE — DISCHARGE NOTE PROVIDER - CARE PROVIDER_API CALL
Jorge Hinojosa)  Internal Medicine; Pulmonary Disease  [address] [place] [address]  Phone: [phone]  Fax: [phone]  Follow Up Time:

## 2020-03-24 NOTE — DISCHARGE NOTE PROVIDER - NSDCCPCAREPLAN_GEN_ALL_CORE_FT
PRINCIPAL DISCHARGE DIAGNOSIS  Diagnosis: Bilateral pneumonia  Assessment and Plan of Treatment:       SECONDARY DISCHARGE DIAGNOSES  Diagnosis: Infection due to [place] coronavirus  Assessment and Plan of Treatment: CORONAVIRUS INSTRUCTIONS:   Based on your current clinical status and stability, it has been determined that you no longer need hospitalization and can recover while remaining in self-quarantine at home. You should follow the prevention steps below until a healthcare provider or local or state health department says you can return to your normal activities.   1. You should restrict activities outside your home, except for getting medical care.   2. Do not go to work, school, or public areas.   3. Avoid using public transportation, ride-sharing, or taxis.   4. Separate yourself from other people and animals in your home as much as possible.  When you are around other people (e.g., sharing a room or vehicle) you should wear a facemask.  5. Wash your hands often with soap and water for at least 20 seconds, especially after blowing your nose, coughing, or sneezing; going to the bathroom; and before eating or preparing food.  6. Cover your mouth and nose with a tissue when you cough or sneeze. Throw used tissues in a lined trash can. Immediately wash your hands with soap and water for at least 20 seconds  7. High touch surfaces include counters, tabletops, doorknobs, bathroom fixtures, toilets, phones, keyboards, tablets, and bedside tables.  8. Avoid sharing dishes, drinking glasses, cups, eating utensils, towels, or bedding with other people or pets in your home. After using these items, they should be washed thoroughly with soap and water.  You are strongly advised to seek prompt medical attention if your illness worsens or you develop new symptoms like fever or difficulty breathing.    Diagnosis: HANNAH (acute kidney injury)  Assessment and Plan of Treatment: Noted to have elevated creatinine level of 2.5 on admission. Nephrology, Dr. Fowler was consulted. Treated with IV fluids. Your creatinine level improved upon discharge. Creatinine level elevation was likely due to dehydration from poor oral intake and diarrhea. Encourage to ensure adequate hydration and proper intake of meals three times a day in order to prevent dehydration and any further damage to kidneys.    Diagnosis: HTN (hypertension)  Assessment and Plan of Treatment: Recommended to have a low salt/DASH diet. Continue with blood pressure medications. Follow up with PCP for management of hypertension.    Diagnosis: Vitamin D deficiency  Assessment and Plan of Treatment: Your Vitamin D level was noted to be low on admission. You were started on 1000 units daily supplementation. You are recommended to continue taking Vitamin D supplementation and follow up with your PCP in 4-6 weeks to recheck your Vitamin D level. PRINCIPAL DISCHARGE DIAGNOSIS  Diagnosis: Bilateral pneumonia  Assessment and Plan of Treatment: Patient presented to the ED for 1 week for fever up to 101-102, diarrhea, and loss of appetite for 5 days. He noticed slight blood in his urine twice the day prior to admission.   Admitted for rule out COVID.   Flu and RSV negative. Chest x-ray showed multifocal pneumonia. Started on ceftriaxone and azithromycin antibiotics. COVID tested positive. Patient treated with IV fluids. COVID resulted positive on 3/22/2020. Patient noted to slowly desaturate on room air. Patient started on hydroxychloroquine and placed on nasal cannula. Repeat CXR showed worsening infiltrates. Patient began improving and tapered off of oxygen. Follow up with your PCP within 1 week for further evaluation and management.         SECONDARY DISCHARGE DIAGNOSES  Diagnosis: Infection due to [place] coronavirus  Assessment and Plan of Treatment: CORONAVIRUS INSTRUCTIONS:   Based on your current clinical status and stability, it has been determined that you no longer need hospitalization and can recover while remaining in self-quarantine at home. You should follow the prevention steps below until a healthcare provider or local or state health department says you can return to your normal activities.   1. You should restrict activities outside your home, except for getting medical care.   2. Do not go to work, school, or public areas.   3. Avoid using public transportation, ride-sharing, or taxis.   4. Separate yourself from other people and animals in your home as much as possible.  When you are around other people (e.g., sharing a room or vehicle) you should wear a facemask.  5. Wash your hands often with soap and water for at least 20 seconds, especially after blowing your nose, coughing, or sneezing; going to the bathroom; and before eating or preparing food.  6. Cover your mouth and nose with a tissue when you cough or sneeze. Throw used tissues in a lined trash can. Immediately wash your hands with soap and water for at least 20 seconds  7. High touch surfaces include counters, tabletops, doorknobs, bathroom fixtures, toilets, phones, keyboards, tablets, and bedside tables.  8. Avoid sharing dishes, drinking glasses, cups, eating utensils, towels, or bedding with other people or pets in your home. After using these items, they should be washed thoroughly with soap and water.  You are strongly advised to seek prompt medical attention if your illness worsens or you develop new symptoms like fever or difficulty breathing.    Diagnosis: HANNAH (acute kidney injury)  Assessment and Plan of Treatment: Noted to have elevated creatinine level of 2.5 on admission. Nephrology, Dr. Fowler was consulted. Treated with IV fluids. Your creatinine level improved upon discharge. Creatinine level elevation was likely due to dehydration from poor oral intake and diarrhea. Encourage to ensure adequate hydration and proper intake of meals three times a day in order to prevent dehydration and any further damage to kidneys.    Diagnosis: HTN (hypertension)  Assessment and Plan of Treatment: Recommended to have a low salt/DASH diet. Continue with blood pressure medications. Follow up with PCP for management of hypertension.    Diagnosis: Vitamin D deficiency  Assessment and Plan of Treatment: Your Vitamin D level was noted to be low on admission. You were started on 1000 units daily supplementation. You are recommended to continue taking Vitamin D supplementation and follow up with your PCP in 4-6 weeks to recheck your Vitamin D level. PRINCIPAL DISCHARGE DIAGNOSIS  Diagnosis: Bilateral pneumonia  Assessment and Plan of Treatment: Patient presented to the ED for 1 week for fever up to 101-102, diarrhea, and loss of appetite for 5 days. He noticed slight blood in his urine twice the day prior to admission.   Admitted for rule out COVID.   Flu and RSV negative. Chest x-ray showed multifocal pneumonia. Started on ceftriaxone and azithromycin antibiotics. COVID tested positive. Patient treated with IV fluids. COVID resulted positive on 3/22/2020. Patient noted to slowly desaturate on room air. Patient started on hydroxychloroquine and placed on nasal cannula. Repeat CXR showed worsening infiltrates. Patient began improving and tapered off of oxygen and saturated well on room air. Patient completed course of hydroxychloroquine. Ensure to self isolate for another 7 days and avoid close contact with other people. If close with others, ensure to wear a mask. Avoid sharing a bedroom with other people. If using a bathroom that is shared with other people, make sure to clean up after each use. Ensure to stay adequately hydrated. Follow up with your PCP within 1 week for further evaluation and management.         SECONDARY DISCHARGE DIAGNOSES  Diagnosis: Infection due to [place] coronavirus  Assessment and Plan of Treatment: CORONAVIRUS INSTRUCTIONS:   Based on your current clinical status and stability, it has been determined that you no longer need hospitalization and can recover while remaining in self-quarantine at home. You should follow the prevention steps below until a healthcare provider or local or state health department says you can return to your normal activities.   1. You should restrict activities outside your home, except for getting medical care.   2. Do not go to work, school, or public areas.   3. Avoid using public transportation, ride-sharing, or taxis.   4. Separate yourself from other people and animals in your home as much as possible.  When you are around other people (e.g., sharing a room or vehicle) you should wear a facemask.  5. Wash your hands often with soap and water for at least 20 seconds, especially after blowing your nose, coughing, or sneezing; going to the bathroom; and before eating or preparing food.  6. Cover your mouth and nose with a tissue when you cough or sneeze. Throw used tissues in a lined trash can. Immediately wash your hands with soap and water for at least 20 seconds  7. High touch surfaces include counters, tabletops, doorknobs, bathroom fixtures, toilets, phones, keyboards, tablets, and bedside tables.  8. Avoid sharing dishes, drinking glasses, cups, eating utensils, towels, or bedding with other people or pets in your home. After using these items, they should be washed thoroughly with soap and water.  You are strongly advised to seek prompt medical attention if your illness worsens or you develop new symptoms like fever or difficulty breathing.    Diagnosis: HANNAH (acute kidney injury)  Assessment and Plan of Treatment: Noted to have elevated creatinine level of 2.5 on admission. Nephrology, Dr. Fowler was consulted. Treated with IV fluids. Your creatinine level improved upon discharge. Creatinine level elevation was likely due to dehydration from poor oral intake and diarrhea. Encourage to ensure adequate hydration and proper intake of meals three times a day in order to prevent dehydration and any further damage to kidneys.    Diagnosis: HTN (hypertension)  Assessment and Plan of Treatment: Recommended to have a low salt/DASH diet. Continue with blood pressure medications. Follow up with PCP for management of hypertension.    Diagnosis: Vitamin D deficiency  Assessment and Plan of Treatment: Your Vitamin D level was noted to be low on admission. You were started on 1000 units daily supplementation. You are recommended to continue taking Vitamin D supplementation and follow up with your PCP in 4-6 weeks to recheck your Vitamin D level. PRINCIPAL DISCHARGE DIAGNOSIS  Diagnosis: Bilateral pneumonia  Assessment and Plan of Treatment: Patient presented to the ED for 1 week for fever up to 101-102, diarrhea, and loss of appetite for 5 days. He noticed slight blood in his urine twice the day prior to admission.   Admitted for rule out COVID.   Flu and RSV negative. Chest x-ray showed multifocal pneumonia. Started on ceftriaxone and azithromycin antibiotics. COVID tested positive. Patient treated with IV fluids. COVID resulted positive on 3/22/2020. Patient noted to slowly desaturate on room air. Patient started on hydroxychloroquine and placed on nasal cannula. Repeat CXR showed worsening infiltrates. Patient began improving and tapered off of oxygen and saturated well on room air. Patient completed course of hydroxychloroquine. Ensure to self isolate for another 7 days and avoid close contact with other people. If close with others, ensure to wear a mask. Avoid sharing a bedroom with other people. If using a bathroom that is shared with other people, make sure to clean up after each use. Ensure to stay adequately hydrated. Follow up with your PCP within 1 week for further evaluation and management. Take Vitamin C 500mg twice a day and thiamine 100mg once a day for 7 more days.         SECONDARY DISCHARGE DIAGNOSES  Diagnosis: Infection due to [place] coronavirus  Assessment and Plan of Treatment: CORONAVIRUS INSTRUCTIONS:   Based on your current clinical status and stability, it has been determined that you no longer need hospitalization and can recover while remaining in self-quarantine at home. You should follow the prevention steps below until a healthcare provider or local or state health department says you can return to your normal activities.   1. You should restrict activities outside your home, except for getting medical care.   2. Do not go to work, school, or public areas.   3. Avoid using public transportation, ride-sharing, or taxis.   4. Separate yourself from other people and animals in your home as much as possible.  When you are around other people (e.g., sharing a room or vehicle) you should wear a facemask.  5. Wash your hands often with soap and water for at least 20 seconds, especially after blowing your nose, coughing, or sneezing; going to the bathroom; and before eating or preparing food.  6. Cover your mouth and nose with a tissue when you cough or sneeze. Throw used tissues in a lined trash can. Immediately wash your hands with soap and water for at least 20 seconds  7. High touch surfaces include counters, tabletops, doorknobs, bathroom fixtures, toilets, phones, keyboards, tablets, and bedside tables.  8. Avoid sharing dishes, drinking glasses, cups, eating utensils, towels, or bedding with other people or pets in your home. After using these items, they should be washed thoroughly with soap and water.  You are strongly advised to seek prompt medical attention if your illness worsens or you develop new symptoms like fever or difficulty breathing.    Diagnosis: HANNAH (acute kidney injury)  Assessment and Plan of Treatment: Noted to have elevated creatinine level of 2.5 on admission. Nephrology, Dr. Fowler was consulted. Treated with IV fluids. Your creatinine level improved upon discharge. Creatinine level elevation was likely due to dehydration from poor oral intake and diarrhea. Encourage to ensure adequate hydration and proper intake of meals three times a day in order to prevent dehydration and any further damage to kidneys.    Diagnosis: HTN (hypertension)  Assessment and Plan of Treatment: Recommended to have a low salt/DASH diet. Continue with blood pressure medications. Follow up with PCP for management of hypertension.    Diagnosis: Vitamin D deficiency  Assessment and Plan of Treatment: Your Vitamin D level was noted to be low on admission. You were started on 1000 units daily supplementation. You are recommended to continue taking Vitamin D supplementation 1000units daily and follow up with your PCP in 4-6 weeks to recheck your Vitamin D level.

## 2020-03-25 LAB
ANION GAP SERPL CALC-SCNC: 6 MMOL/L — SIGNIFICANT CHANGE UP (ref 5–17)
BASOPHILS # BLD AUTO: 0.01 K/UL — SIGNIFICANT CHANGE UP (ref 0–0.2)
BASOPHILS NFR BLD AUTO: 0.2 % — SIGNIFICANT CHANGE UP (ref 0–2)
BUN SERPL-MCNC: 11 MG/DL — SIGNIFICANT CHANGE UP (ref 7–18)
CALCIUM SERPL-MCNC: 7.9 MG/DL — LOW (ref 8.4–10.5)
CHLORIDE SERPL-SCNC: 106 MMOL/L — SIGNIFICANT CHANGE UP (ref 96–108)
CK SERPL-CCNC: 280 U/L — HIGH (ref 35–232)
CO2 SERPL-SCNC: 27 MMOL/L — SIGNIFICANT CHANGE UP (ref 22–31)
CREAT SERPL-MCNC: 0.93 MG/DL — SIGNIFICANT CHANGE UP (ref 0.5–1.3)
EOSINOPHIL # BLD AUTO: 0.01 K/UL — SIGNIFICANT CHANGE UP (ref 0–0.5)
EOSINOPHIL NFR BLD AUTO: 0.2 % — SIGNIFICANT CHANGE UP (ref 0–6)
GBM IGG SER-ACNC: <1 AI — SIGNIFICANT CHANGE UP
GLUCOSE SERPL-MCNC: 94 MG/DL — SIGNIFICANT CHANGE UP (ref 70–99)
HCT VFR BLD CALC: 38.8 % — LOW (ref 39–50)
HGB BLD-MCNC: 13.4 G/DL — SIGNIFICANT CHANGE UP (ref 13–17)
IMM GRANULOCYTES NFR BLD AUTO: 0.4 % — SIGNIFICANT CHANGE UP (ref 0–1.5)
LYMPHOCYTES # BLD AUTO: 1.06 K/UL — SIGNIFICANT CHANGE UP (ref 1–3.3)
LYMPHOCYTES # BLD AUTO: 18.7 % — SIGNIFICANT CHANGE UP (ref 13–44)
MAGNESIUM SERPL-MCNC: 2.2 MG/DL — SIGNIFICANT CHANGE UP (ref 1.6–2.6)
MCHC RBC-ENTMCNC: 30.9 PG — SIGNIFICANT CHANGE UP (ref 27–34)
MCHC RBC-ENTMCNC: 34.5 GM/DL — SIGNIFICANT CHANGE UP (ref 32–36)
MCV RBC AUTO: 89.6 FL — SIGNIFICANT CHANGE UP (ref 80–100)
MONOCYTES # BLD AUTO: 0.42 K/UL — SIGNIFICANT CHANGE UP (ref 0–0.9)
MONOCYTES NFR BLD AUTO: 7.4 % — SIGNIFICANT CHANGE UP (ref 2–14)
NEUTROPHILS # BLD AUTO: 4.14 K/UL — SIGNIFICANT CHANGE UP (ref 1.8–7.4)
NEUTROPHILS NFR BLD AUTO: 73.1 % — SIGNIFICANT CHANGE UP (ref 43–77)
NRBC # BLD: 0 /100 WBCS — SIGNIFICANT CHANGE UP (ref 0–0)
PHOSPHATE SERPL-MCNC: 2 MG/DL — LOW (ref 2.5–4.5)
PLATELET # BLD AUTO: 194 K/UL — SIGNIFICANT CHANGE UP (ref 150–400)
POTASSIUM SERPL-MCNC: 4 MMOL/L — SIGNIFICANT CHANGE UP (ref 3.5–5.3)
POTASSIUM SERPL-SCNC: 4 MMOL/L — SIGNIFICANT CHANGE UP (ref 3.5–5.3)
RBC # BLD: 4.33 M/UL — SIGNIFICANT CHANGE UP (ref 4.2–5.8)
RBC # FLD: 12.3 % — SIGNIFICANT CHANGE UP (ref 10.3–14.5)
SODIUM SERPL-SCNC: 139 MMOL/L — SIGNIFICANT CHANGE UP (ref 135–145)
WBC # BLD: 5.66 K/UL — SIGNIFICANT CHANGE UP (ref 3.8–10.5)
WBC # FLD AUTO: 5.66 K/UL — SIGNIFICANT CHANGE UP (ref 3.8–10.5)

## 2020-03-25 RX ORDER — ACETAMINOPHEN 500 MG
650 TABLET ORAL ONCE
Refills: 0 | Status: COMPLETED | OUTPATIENT
Start: 2020-03-25 | End: 2020-03-25

## 2020-03-25 RX ORDER — AZITHROMYCIN 500 MG/1
500 TABLET, FILM COATED ORAL DAILY
Refills: 0 | Status: DISCONTINUED | OUTPATIENT
Start: 2020-03-25 | End: 2020-03-26

## 2020-03-25 RX ORDER — POTASSIUM PHOSPHATE, MONOBASIC POTASSIUM PHOSPHATE, DIBASIC 236; 224 MG/ML; MG/ML
30 INJECTION, SOLUTION INTRAVENOUS ONCE
Refills: 0 | Status: COMPLETED | OUTPATIENT
Start: 2020-03-25 | End: 2020-03-25

## 2020-03-25 RX ADMIN — Medication 650 MILLIGRAM(S): at 16:06

## 2020-03-25 RX ADMIN — AZITHROMYCIN 255 MILLIGRAM(S): 500 TABLET, FILM COATED ORAL at 13:08

## 2020-03-25 RX ADMIN — Medication 650 MILLIGRAM(S): at 03:12

## 2020-03-25 RX ADMIN — CEFTRIAXONE 100 MILLIGRAM(S): 500 INJECTION, POWDER, FOR SOLUTION INTRAMUSCULAR; INTRAVENOUS at 13:54

## 2020-03-25 RX ADMIN — Medication 650 MILLIGRAM(S): at 19:20

## 2020-03-25 RX ADMIN — ENOXAPARIN SODIUM 40 MILLIGRAM(S): 100 INJECTION SUBCUTANEOUS at 11:22

## 2020-03-25 RX ADMIN — Medication 650 MILLIGRAM(S): at 23:55

## 2020-03-25 RX ADMIN — Medication 650 MILLIGRAM(S): at 08:48

## 2020-03-25 RX ADMIN — Medication 1000 UNIT(S): at 11:22

## 2020-03-25 RX ADMIN — Medication 650 MILLIGRAM(S): at 01:18

## 2020-03-25 RX ADMIN — Medication 650 MILLIGRAM(S): at 12:00

## 2020-03-25 RX ADMIN — POTASSIUM PHOSPHATE, MONOBASIC POTASSIUM PHOSPHATE, DIBASIC 83.33 MILLIMOLE(S): 236; 224 INJECTION, SOLUTION INTRAVENOUS at 15:36

## 2020-03-25 RX ADMIN — Medication 650 MILLIGRAM(S): at 23:54

## 2020-03-25 RX ADMIN — Medication 650 MILLIGRAM(S): at 05:27

## 2020-03-25 NOTE — PROGRESS NOTE ADULT - PROBLEM SELECTOR PLAN 4
- home med: Amlodipine , Losartan - HCTZ , not sure of the dose.   - Hold Losartan- HCTZ in setting of HANNAH   - hold BP meds  - Monitor BP

## 2020-03-25 NOTE — PROGRESS NOTE ADULT - SUBJECTIVE AND OBJECTIVE BOX
Pt is awake, alert, lying in bed in NAD. On isolation precautions for Covid-19.   INTERVAL HPI/OVERNIGHT EVENTS:      VITAL SIGNS:  T(F): 101.8 (03-25-20 @ 08:23)  HR: 74 (03-25-20 @ 08:23)  BP: 148/84 (03-25-20 @ 08:23)  RR: 20 (03-25-20 @ 08:23)  SpO2: 93% (03-25-20 @ 08:23)  Wt(kg): --  I&O's Detail          REVIEW OF SYSTEMS:    CONSTITUTIONAL:  No fevers, chills, sweats    HEENT:  Eyes:  No diplopia or blurred vision. ENT:  No earache, sore throat or runny nose.    CARDIOVASCULAR:  No pressure, squeezing, tightness, or heaviness about the chest; no palpitations.    RESPIRATORY:  Per HPI    GASTROINTESTINAL:  No abdominal pain, nausea, vomiting or diarrhea.    GENITOURINARY:  No dysuria, frequency or urgency.    NEUROLOGIC:  No paresthesias, fasciculations, seizures or weakness.    PSYCHIATRIC:  No disorder of thought or mood.      PHYSICAL EXAM:    Constitutional: Well developed and nourished  Eyes:Perrla  ENMT: normal  Neck:supple  Respiratory: good air entry  Cardiovascular: S1 S2 regular  Gastrointestinal: Soft, Non tender  Extremities: No edema  Vascular:normal  Neurological:Awake, alert,Ox3  Musculoskeletal:Normal      MEDICATIONS  (STANDING):  azithromycin  IVPB 500 milliGRAM(s) IV Intermittent every 24 hours  cefTRIAXone   IVPB 1000 milliGRAM(s) IV Intermittent every 24 hours  cholecalciferol 1000 Unit(s) Oral daily  enoxaparin Injectable 40 milliGRAM(s) SubCutaneous daily  lactated ringers 1000 milliLiter(s) (100 mL/Hr) IV Continuous <Continuous>  potassium phosphate IVPB 30 milliMole(s) IV Intermittent once    MEDICATIONS  (PRN):  acetaminophen   Tablet .. 650 milliGRAM(s) Oral every 6 hours PRN Temp greater or equal to 38C (100.4F), Moderate Pain (4 - 6)  ALBUTerol    90 MICROgram(s) HFA Inhaler 2 Puff(s) Inhalation every 6 hours PRN Bronchospasm  guaiFENesin   Syrup  (Sugar-Free) 200 milliGRAM(s) Oral every 6 hours PRN Cough      Allergies    No Known Allergies    Intolerances        LABS:                        13.4   5.66  )-----------( 194      ( 25 Mar 2020 08:12 )             38.8     03-25    139  |  106  |  11  ----------------------------<  94  4.0   |  27  |  0.93    Ca    7.9<L>      25 Mar 2020 08:12  Phos  2.0     03-25  Mg     2.2     03-25      COVID-19 PCR . (03.23.20 @ 09:59)    COVID-19 PCR: Detected: All “detected” results on this new test are considered presumptively  positive results, are clinically actionable, and specimens will be  forwarded to [facility] [place][note 2] for confirmation testing.  Another report (corrected report) will only be issued if discordant  results occur.  This test has been validated by [employer] to be accurate;  though it has not been FDA cleared/approved by the usual pathway.  As with all laboratory tests, results should be correlated with clinical  findings.          CARDIAC MARKERS ( 25 Mar 2020 08:12 )  x     / x     / 280 U/L / x     / x      CARDIAC MARKERS ( 24 Mar 2020 07:41 )  x     / x     / 402 U/L / x     / x          CAPILLARY BLOOD GLUCOSE            RADIOLOGY & ADDITIONAL TESTS:    CXR:  < from: Xray Chest 2 Views PA/Lat (03.22.20 @ 15:38) >  IMPRESSION:  Patchy airspace opacities noted in the left upper lobe and right lower lobe suspicious for multifocal pneumonia.      < end of copied text >    Ct scan chest:  < from: CT Abdomen and Pelvis No Cont (03.22.20 @ 15:27) >  IMPRESSION:     Mural thickening of the urinary bladder, possibly due to underdistention. Underlying cystitis is not excluded.    < end of copied text >    ekg;    echo: Pt is awake, alert, lying in bed in NAD. On isolation precautions for Covid-19. No diarrhea today  INTERVAL HPI/OVERNIGHT EVENTS:      VITAL SIGNS:  T(F): 101.8 (03-25-20 @ 08:23)  HR: 74 (03-25-20 @ 08:23)  BP: 148/84 (03-25-20 @ 08:23)  RR: 20 (03-25-20 @ 08:23)  SpO2: 93% (03-25-20 @ 08:23)  Wt(kg): --  I&O's Detail          REVIEW OF SYSTEMS:    CONSTITUTIONAL:  No fevers, chills, sweats    HEENT:  Eyes:  No diplopia or blurred vision. ENT:  No earache, sore throat or runny nose.    CARDIOVASCULAR:  No pressure, squeezing, tightness, or heaviness about the chest; no palpitations.    RESPIRATORY:  Per HPI    GASTROINTESTINAL:  No abdominal pain, nausea, vomiting or diarrhea.    GENITOURINARY:  No dysuria, frequency or urgency.    NEUROLOGIC:  No paresthesias, fasciculations, seizures or weakness.    PSYCHIATRIC:  No disorder of thought or mood.      PHYSICAL EXAM:    Constitutional: Well developed and nourished  Eyes:Perrla  ENMT: normal  Neck:supple  Respiratory: good air entry  Cardiovascular: S1 S2 regular  Gastrointestinal: Soft, Non tender  Extremities: No edema  Vascular:normal  Neurological:Awake, alert,Ox3  Musculoskeletal:Normal      MEDICATIONS  (STANDING):  azithromycin  IVPB 500 milliGRAM(s) IV Intermittent every 24 hours  cefTRIAXone   IVPB 1000 milliGRAM(s) IV Intermittent every 24 hours  cholecalciferol 1000 Unit(s) Oral daily  enoxaparin Injectable 40 milliGRAM(s) SubCutaneous daily  lactated ringers 1000 milliLiter(s) (100 mL/Hr) IV Continuous <Continuous>  potassium phosphate IVPB 30 milliMole(s) IV Intermittent once    MEDICATIONS  (PRN):  acetaminophen   Tablet .. 650 milliGRAM(s) Oral every 6 hours PRN Temp greater or equal to 38C (100.4F), Moderate Pain (4 - 6)  ALBUTerol    90 MICROgram(s) HFA Inhaler 2 Puff(s) Inhalation every 6 hours PRN Bronchospasm  guaiFENesin   Syrup  (Sugar-Free) 200 milliGRAM(s) Oral every 6 hours PRN Cough      Allergies    No Known Allergies    Intolerances        LABS:                        13.4   5.66  )-----------( 194      ( 25 Mar 2020 08:12 )             38.8     03-25    139  |  106  |  11  ----------------------------<  94  4.0   |  27  |  0.93    Ca    7.9<L>      25 Mar 2020 08:12  Phos  2.0     03-25  Mg     2.2     03-25      COVID-19 PCR . (03.23.20 @ 09:59)    COVID-19 PCR: Detected: All “detected” results on this new test are considered presumptively  positive results, are clinically actionable, and specimens will be  forwarded to [facility] for confirmation testing.  Another report (corrected report) will only be issued if discordant  results occur.  This test has been validated by [employer] to be accurate;  though it has not been FDA cleared/approved by the usual pathway.  As with all laboratory tests, results should be correlated with clinical  findings.          CARDIAC MARKERS ( 25 Mar 2020 08:12 )  x     / x     / 280 U/L / x     / x      CARDIAC MARKERS ( 24 Mar 2020 07:41 )  x     / x     / 402 U/L / x     / x          CAPILLARY BLOOD GLUCOSE            RADIOLOGY & ADDITIONAL TESTS:    CXR:  < from: Xray Chest 2 Views PA/Lat (03.22.20 @ 15:38) >  IMPRESSION:  Patchy airspace opacities noted in the left upper lobe and right lower lobe suspicious for multifocal pneumonia.      < end of copied text >    Ct scan chest:  < from: CT Abdomen and Pelvis No Cont (03.22.20 @ 15:27) >  IMPRESSION:     Mural thickening of the urinary bladder, possibly due to underdistention. Underlying cystitis is not excluded.    < end of copied text >    ekg;    echo:

## 2020-03-25 NOTE — PROGRESS NOTE ADULT - SUBJECTIVE AND OBJECTIVE BOX
Patient is a 59y old  Male who presents with a chief complaint of hematuria and fever (24 Mar 2020 16:05)    pt seen in tele [ x ], reg med floor [   ], bed [ x ], chair at bedside [   ], a+o x3 [x  ], lethargic [  ],  nad [ x ]       Allergies    No Known Allergies        Vitals    T(F): 101.8 (03-25-20 @ 08:23), Max: 102.1 (03-24-20 @ 23:30)  HR: 74 (03-25-20 @ 08:23) (63 - 77)  BP: 148/84 (03-25-20 @ 08:23) (123/83 - 161/82)  RR: 20 (03-25-20 @ 08:23) (18 - 22)  SpO2: 93% (03-25-20 @ 08:23) (93% - 95%)  Wt(kg): --  CAPILLARY BLOOD GLUCOSE          Labs                          13.4   5.66  )-----------( 194      ( 25 Mar 2020 08:12 )             38.8       03-25    139  |  106  |  11  ----------------------------<  94  4.0   |  27  |  0.93    Ca    7.9<L>      25 Mar 2020 08:12  Phos  2.0     03-25  Mg     2.2     03-25    TPro  6.9  /  Alb  3.0<L>  /  TBili  0.4  /  DBili  x   /  AST  43<H>  /  ALT  39  /  AlkPhos  33<L>  03-23      CARDIAC MARKERS ( 25 Mar 2020 08:12 )  x     / x     / 280 U/L / x     / x      CARDIAC MARKERS ( 24 Mar 2020 07:41 )  x     / x     / 402 U/L / x     / x      CARDIAC MARKERS ( 23 Mar 2020 10:46 )  x     / x     / 511 U/L / x     / x            .Urine  03-22 @ 21:59   No growth  --  --          Radiology Results      Meds    MEDICATIONS  (STANDING):  azithromycin  IVPB 500 milliGRAM(s) IV Intermittent every 24 hours  cefTRIAXone   IVPB 1000 milliGRAM(s) IV Intermittent every 24 hours  cholecalciferol 1000 Unit(s) Oral daily  enoxaparin Injectable 40 milliGRAM(s) SubCutaneous daily  lactated ringers 1000 milliLiter(s) (100 mL/Hr) IV Continuous <Continuous>  potassium phosphate IVPB 30 milliMole(s) IV Intermittent once      MEDICATIONS  (PRN):  acetaminophen   Tablet .. 650 milliGRAM(s) Oral every 6 hours PRN Temp greater or equal to 38C (100.4F), Moderate Pain (4 - 6)  ALBUTerol    90 MICROgram(s) HFA Inhaler 2 Puff(s) Inhalation every 6 hours PRN Bronchospasm  guaiFENesin   Syrup  (Sugar-Free) 200 milliGRAM(s) Oral every 6 hours PRN Cough      Physical Exam      Neuro :  no focal deficits  Respiratory: CTA B/L  CV: RRR, S1S2, no murmurs,   Abdominal: Soft, NT, ND +BS,  Extremities: No edema, + peripheral pulses    ASSESSMENT    multifocal Pneumonia due to organism   r/o covid 19   mild rhabdomyolysis  s/p juno  s/p hyponatremia  h/o HTN (hypertension)  H/O colectomy        PLAN      cont roceph and zithromax  rsv neg noted above  covid -19 positive   tmx 102.1  current temp 101.8  O2 sat on ra 93% ( range 93-95)  cont to monitor O2 need  contact and airborne isolation  cont albuterol inhaler   procalcitonin neg noted above  cont supportive care with tylenol prn, robitussin prn and O2 via nasal canula if needed   blood and ucx neg  pulm f/u   ct abd with Mural thickening of the urinary bladder, possibly due to underdistention. Underlying cystitis is not excluded noted above.   doubt hematuria at present as ua neg for rbc's or infectious process  elevated ck improving noted above  d/c ivf  serum creat wnl noted above  renal f/u   cont current meds

## 2020-03-25 NOTE — PROGRESS NOTE ADULT - PROBLEM SELECTOR PLAN 1
p/w fever, diarrhea   - Flu: neg   - CXR: multifocal PNA   Ed course; Rocephin and Azithro   - Will c/w Rocephin and Azithro to cover for CAP  - Covid 19 positive: contact and airborne isolation precaution   - Blood culture negative   - Tylenol, Albuterol Inhaler, Robitussin PRN  -monitor O2 sat; currently at 93% on room air  -f/u RVP

## 2020-03-25 NOTE — PROGRESS NOTE ADULT - ASSESSMENT
Assessment and Recommendation:   Problem/Recommendation - 1:  Problem: Covid-19 + Recommendation: with B/L PNA.   Continue antibiotics  oxygen supp prn  monitor SpO2.  monitor temp and WBC.  Fluids for dehydration 2nd to diarrhea and fever.   Albuterol inhaler PRN.   Tylenol and Robitussin PRN   Isolation precautions    Problem/Recommendation - 2:  ·  Problem: HTN (hypertension).  Recommendation: monitor BP  cont meds.     Problem/Recommendation - 3:  ·  Problem: HANNAH (acute kidney injury).  Recommendation: Avoid nephrotoxic drugs  monitor BMP  IVF  CT abdomen/pelvis noted - urinary bladder mural thickening.    eval

## 2020-03-25 NOTE — PROGRESS NOTE ADULT - SUBJECTIVE AND OBJECTIVE BOX
PGY 1 Note discussed with supervising resident and primary attending    Patient is a 59y old  Male who presents with a chief complaint of hematuria and fever (25 Mar 2020 09:34)    INTERVAL HPI/OVERNIGHT EVENTS: Patient seen and examined at the bedside. Patient is COVID positive. Saturating at 93% on room air currently. Denies any shortness of breath or cough. Patient has been febrile overnight. Denies any episodes of diarrhea this morning. Denies any other complaints or concerns at this time.     MEDICATIONS  (STANDING):  azithromycin  IVPB 500 milliGRAM(s) IV Intermittent every 24 hours  cefTRIAXone   IVPB 1000 milliGRAM(s) IV Intermittent every 24 hours  cholecalciferol 1000 Unit(s) Oral daily  enoxaparin Injectable 40 milliGRAM(s) SubCutaneous daily  lactated ringers 1000 milliLiter(s) (100 mL/Hr) IV Continuous <Continuous>  potassium phosphate IVPB 30 milliMole(s) IV Intermittent once    MEDICATIONS  (PRN):  acetaminophen   Tablet .. 650 milliGRAM(s) Oral every 6 hours PRN Temp greater or equal to 38C (100.4F), Moderate Pain (4 - 6)  ALBUTerol    90 MICROgram(s) HFA Inhaler 2 Puff(s) Inhalation every 6 hours PRN Bronchospasm  guaiFENesin   Syrup  (Sugar-Free) 200 milliGRAM(s) Oral every 6 hours PRN Cough      __________________________________________________  REVIEW OF SYSTEMS:  CONSTITUTIONAL: Fever  EYES: no visual disturbances  NECK: No pain   RESPIRATORY: No cough; No shortness of breath  CARDIOVASCULAR: No chest pain  GASTROINTESTINAL: No pain. No nausea or vomiting; No diarrhea   NEUROLOGICAL: No headache   MUSCULOSKELETAL: No joint pain, no muscle pain  GENITOURINARY: no dysuria  PSYCHIATRY: no anxiety  ALL OTHER  ROS negative        Vital Signs Last 24 Hrs  T(C): 38.8 (25 Mar 2020 08:23), Max: 38.9 (24 Mar 2020 23:30)  T(F): 101.8 (25 Mar 2020 08:23), Max: 102.1 (24 Mar 2020 23:30)  HR: 74 (25 Mar 2020 08:23) (63 - 77)  BP: 148/84 (25 Mar 2020 08:23) (123/83 - 161/82)  RR: 20 (25 Mar 2020 08:23) (18 - 22)  SpO2: 93% (25 Mar 2020 08:23) (93% - 95%)    ________________________________________________  PHYSICAL EXAM:  GENERAL: Patient seen resting in bed and in mild distress due to fever  HEENT: Normocephalic; conjunctivae and sclerae clear   NECK: supple  CHEST/LUNG: Mild crackles present to auscultation    HEART: S1 S2, regular; no murmurs  ABDOMEN: Soft, Nontender, Nondistended; Bowel sounds present  EXTREMITIES: no edema; no calf tenderness  SKIN: warm and dry  NERVOUS SYSTEM: Awake and alert; Oriented to place, person and time     _________________________________________________  LABS:                        13.4   5.66  )-----------( 194      ( 25 Mar 2020 08:12 )             38.8     03-25    139  |  106  |  11  ----------------------------<  94  4.0   |  27  |  0.93    Ca    7.9<L>      25 Mar 2020 08:12  Phos  2.0     03-25  Mg     2.2     03-25    TPro  6.9  /  Alb  3.0<L>  /  TBili  0.4  /  DBili  x   /  AST  43<H>  /  ALT  39  /  AlkPhos  33<L>  03-23      RADIOLOGY & ADDITIONAL TESTS:    Imaging Personally Reviewed:  YES    No new imaging     Consultant(s) Notes Reviewed:   YES    Care Discussed with Consultants :     Plan of care was discussed with patient and /or primary care giver; all questions and concerns were addressed and care was aligned with patient's wishes.

## 2020-03-26 LAB
ANION GAP SERPL CALC-SCNC: 7 MMOL/L — SIGNIFICANT CHANGE UP (ref 5–17)
BASOPHILS # BLD AUTO: 0.01 K/UL — SIGNIFICANT CHANGE UP (ref 0–0.2)
BASOPHILS NFR BLD AUTO: 0.1 % — SIGNIFICANT CHANGE UP (ref 0–2)
BUN SERPL-MCNC: 10 MG/DL — SIGNIFICANT CHANGE UP (ref 7–18)
CALCIUM SERPL-MCNC: 8.2 MG/DL — LOW (ref 8.4–10.5)
CHLORIDE SERPL-SCNC: 104 MMOL/L — SIGNIFICANT CHANGE UP (ref 96–108)
CO2 SERPL-SCNC: 27 MMOL/L — SIGNIFICANT CHANGE UP (ref 22–31)
CREAT SERPL-MCNC: 0.86 MG/DL — SIGNIFICANT CHANGE UP (ref 0.5–1.3)
EOSINOPHIL # BLD AUTO: 0.01 K/UL — SIGNIFICANT CHANGE UP (ref 0–0.5)
EOSINOPHIL NFR BLD AUTO: 0.1 % — SIGNIFICANT CHANGE UP (ref 0–6)
GLUCOSE SERPL-MCNC: 104 MG/DL — HIGH (ref 70–99)
HCT VFR BLD CALC: 40.8 % — SIGNIFICANT CHANGE UP (ref 39–50)
HGB BLD-MCNC: 13.8 G/DL — SIGNIFICANT CHANGE UP (ref 13–17)
IMM GRANULOCYTES NFR BLD AUTO: 0.6 % — SIGNIFICANT CHANGE UP (ref 0–1.5)
LYMPHOCYTES # BLD AUTO: 0.97 K/UL — LOW (ref 1–3.3)
LYMPHOCYTES # BLD AUTO: 11.9 % — LOW (ref 13–44)
MAGNESIUM SERPL-MCNC: 2.2 MG/DL — SIGNIFICANT CHANGE UP (ref 1.6–2.6)
MCHC RBC-ENTMCNC: 30.3 PG — SIGNIFICANT CHANGE UP (ref 27–34)
MCHC RBC-ENTMCNC: 33.8 GM/DL — SIGNIFICANT CHANGE UP (ref 32–36)
MCV RBC AUTO: 89.5 FL — SIGNIFICANT CHANGE UP (ref 80–100)
MONOCYTES # BLD AUTO: 0.56 K/UL — SIGNIFICANT CHANGE UP (ref 0–0.9)
MONOCYTES NFR BLD AUTO: 6.9 % — SIGNIFICANT CHANGE UP (ref 2–14)
NEUTROPHILS # BLD AUTO: 6.53 K/UL — SIGNIFICANT CHANGE UP (ref 1.8–7.4)
NEUTROPHILS NFR BLD AUTO: 80.4 % — HIGH (ref 43–77)
NRBC # BLD: 0 /100 WBCS — SIGNIFICANT CHANGE UP (ref 0–0)
PHOSPHATE SERPL-MCNC: 1.8 MG/DL — LOW (ref 2.5–4.5)
PLATELET # BLD AUTO: 241 K/UL — SIGNIFICANT CHANGE UP (ref 150–400)
POTASSIUM SERPL-MCNC: 4 MMOL/L — SIGNIFICANT CHANGE UP (ref 3.5–5.3)
POTASSIUM SERPL-SCNC: 4 MMOL/L — SIGNIFICANT CHANGE UP (ref 3.5–5.3)
RAPID RVP RESULT: SIGNIFICANT CHANGE UP
RBC # BLD: 4.56 M/UL — SIGNIFICANT CHANGE UP (ref 4.2–5.8)
RBC # FLD: 12.3 % — SIGNIFICANT CHANGE UP (ref 10.3–14.5)
SODIUM SERPL-SCNC: 138 MMOL/L — SIGNIFICANT CHANGE UP (ref 135–145)
WBC # BLD: 8.13 K/UL — SIGNIFICANT CHANGE UP (ref 3.8–10.5)
WBC # FLD AUTO: 8.13 K/UL — SIGNIFICANT CHANGE UP (ref 3.8–10.5)

## 2020-03-26 PROCEDURE — 71045 X-RAY EXAM CHEST 1 VIEW: CPT | Mod: 26

## 2020-03-26 RX ORDER — ASCORBIC ACID 60 MG
500 TABLET,CHEWABLE ORAL
Refills: 0 | Status: DISCONTINUED | OUTPATIENT
Start: 2020-03-26 | End: 2020-03-31

## 2020-03-26 RX ORDER — ONDANSETRON 8 MG/1
4 TABLET, FILM COATED ORAL EVERY 4 HOURS
Refills: 0 | Status: DISCONTINUED | OUTPATIENT
Start: 2020-03-26 | End: 2020-03-31

## 2020-03-26 RX ORDER — HYDROXYCHLOROQUINE SULFATE 200 MG
200 TABLET ORAL EVERY 12 HOURS
Refills: 0 | Status: COMPLETED | OUTPATIENT
Start: 2020-03-27 | End: 2020-03-30

## 2020-03-26 RX ORDER — POTASSIUM PHOSPHATE, MONOBASIC POTASSIUM PHOSPHATE, DIBASIC 236; 224 MG/ML; MG/ML
30 INJECTION, SOLUTION INTRAVENOUS ONCE
Refills: 0 | Status: COMPLETED | OUTPATIENT
Start: 2020-03-26 | End: 2020-03-26

## 2020-03-26 RX ORDER — SODIUM CHLORIDE 9 MG/ML
1000 INJECTION, SOLUTION INTRAVENOUS
Refills: 0 | Status: DISCONTINUED | OUTPATIENT
Start: 2020-03-26 | End: 2020-03-31

## 2020-03-26 RX ORDER — HYDROXYCHLOROQUINE SULFATE 200 MG
400 TABLET ORAL EVERY 12 HOURS
Refills: 0 | Status: COMPLETED | OUTPATIENT
Start: 2020-03-26 | End: 2020-03-27

## 2020-03-26 RX ADMIN — Medication 400 MILLIGRAM(S): at 17:40

## 2020-03-26 RX ADMIN — Medication 1000 UNIT(S): at 11:36

## 2020-03-26 RX ADMIN — Medication 650 MILLIGRAM(S): at 13:47

## 2020-03-26 RX ADMIN — Medication 500 MILLIGRAM(S): at 17:40

## 2020-03-26 RX ADMIN — POTASSIUM PHOSPHATE, MONOBASIC POTASSIUM PHOSPHATE, DIBASIC 83.33 MILLIMOLE(S): 236; 224 INJECTION, SOLUTION INTRAVENOUS at 12:34

## 2020-03-26 RX ADMIN — Medication 650 MILLIGRAM(S): at 13:17

## 2020-03-26 RX ADMIN — AZITHROMYCIN 500 MILLIGRAM(S): 500 TABLET, FILM COATED ORAL at 11:36

## 2020-03-26 RX ADMIN — ENOXAPARIN SODIUM 40 MILLIGRAM(S): 100 INJECTION SUBCUTANEOUS at 11:37

## 2020-03-26 NOTE — PROGRESS NOTE ADULT - SUBJECTIVE AND OBJECTIVE BOX
Patient is a 59y old  Male who presents with a chief complaint of hematuria and fever (26 Mar 2020 11:03)    pt seen in tele [ x ], reg med floor [   ], bed [ x ], chair at bedside [   ], a+o x3 [x  ], lethargic [  ],  nad [ x ]     pt still with diarrhea and increasing O2 need      Allergies    No Known Allergies        Vitals    T(F): 101 (03-26-20 @ 08:39), Max: 101.7 (03-25-20 @ 23:40)  HR: 77 (03-26-20 @ 08:39) (67 - 87)  BP: 155/81 (03-26-20 @ 08:39) (125/71 - 155/81)  RR: 22 (03-26-20 @ 08:39) (18 - 22)  SpO2: 91% (03-26-20 @ 08:39) (91% - 96%)  Wt(kg): --  CAPILLARY BLOOD GLUCOSE          Labs                          13.8   8.13  )-----------( 241      ( 26 Mar 2020 07:33 )             40.8       03-26    138  |  104  |  10  ----------------------------<  104<H>  4.0   |  27  |  0.86    Ca    8.2<L>      26 Mar 2020 07:33  Phos  1.8     03-26  Mg     2.2     03-26        CARDIAC MARKERS ( 25 Mar 2020 08:12 )  x     / x     / 280 U/L / x     / x            .Urine  03-22 @ 21:59   No growth  --  --          Radiology Results      Meds    MEDICATIONS  (STANDING):  ascorbic acid 500 milliGRAM(s) Oral two times a day  azithromycin   Tablet 500 milliGRAM(s) Oral daily  cefTRIAXone   IVPB 1000 milliGRAM(s) IV Intermittent every 24 hours  cholecalciferol 1000 Unit(s) Oral daily  dextrose 5% + sodium chloride 0.9%. 1000 milliLiter(s) (70 mL/Hr) IV Continuous <Continuous>  enoxaparin Injectable 40 milliGRAM(s) SubCutaneous daily  hydroxychloroquine 400 milliGRAM(s) Oral every 12 hours  lactated ringers 1000 milliLiter(s) (100 mL/Hr) IV Continuous <Continuous>  potassium phosphate IVPB 30 milliMole(s) IV Intermittent once      MEDICATIONS  (PRN):  acetaminophen   Tablet .. 650 milliGRAM(s) Oral every 6 hours PRN Temp greater or equal to 38C (100.4F), Moderate Pain (4 - 6)  ALBUTerol    90 MICROgram(s) HFA Inhaler 2 Puff(s) Inhalation every 6 hours PRN Bronchospasm  guaiFENesin   Syrup  (Sugar-Free) 200 milliGRAM(s) Oral every 6 hours PRN Cough  ondansetron Injectable 4 milliGRAM(s) IV Push every 4 hours PRN Nausea and/or Vomiting      Physical Exam        Neuro :  no focal deficits  Respiratory: CTA B/L  CV: RRR, S1S2, no murmurs,   Abdominal: Soft, NT, ND +BS,  Extremities: No edema, + peripheral pulses    ASSESSMENT    multifocal Pneumonia due to organism   r/o covid 19   mild rhabdomyolysis  s/p juno  s/p hyponatremia  h/o HTN (hypertension)  H/O colectomy        PLAN      d/c roceph   to complete 5 day zithromax  rsv neg noted above  covid -19 positive   tmx 101.7  current temp 101.  O2 sat on ra 91% ( range 91-96)  add O2 2l n/c   start plaquenil 400mg  q12 x 1 day then, 200mg bid x4  contact and airborne isolation  cont albuterol inhaler   procalcitonin neg noted above  cont supportive care with tylenol prn, robitussin prn and O2 via nasal canula if needed   blood and ucx neg  pulm f/u   ct abd with Mural thickening of the urinary bladder, possibly due to underdistention. Underlying cystitis is not excluded noted above.   doubt hematuria at present as ua neg for rbc's or infectious process  elevated ck improving noted above  d/c ivf  serum creat wnl noted above  renal f/u   cont current meds

## 2020-03-26 NOTE — PROGRESS NOTE ADULT - ASSESSMENT
Assessment and Recommendation: 1  Problem: Covid-19 + Recommendation: with B/L PNA.   Continue Antibiotics  Oxygen Supp prn  Monitor SpO2.  Monitor temp and WBC.  Fluids for dehydration 2nd to diarrhea and fever.   Albuterol inhaler PRN.   Tylenol and Robitussin PRN   Isolation precautions    Problem/Recommendation - 2:  ·  Problem: HTN (hypertension).  Recommendation: monitor BP  Cont meds.     Problem/Recommendation - 3:  ·  Problem: HANNAH (acute kidney injury).  Recommendation: Avoid nephrotoxic drugs  Monitor BMP  IVF  CT abdomen/pelvis noted - urinary bladder mural thickening.    eval Assessment and Recommendation: 1  Problem: Covid-19 + Recommendation: with B/L PNA.   Continue Antibiotics  Oxygen Supp prn  Monitor SpO2.  Monitor temp and WBC.  Fluids for dehydration 2nd to diarrhea and fever.  Zofram IV for nausea   Albuterol inhaler PRN.   Tylenol and Robitussin PRN   Isolation precautions    Problem/Recommendation - 2:  ·  Problem: HTN (hypertension).  Recommendation: monitor BP  Cont meds.     Problem/Recommendation - 3:  ·  Problem: HANNAH (acute kidney injury).  Recommendation: Avoid nephrotoxic drugs  Monitor BMP  IVF  CT abdomen/pelvis noted - urinary bladder mural thickening.    eval

## 2020-03-26 NOTE — PROGRESS NOTE ADULT - SUBJECTIVE AND OBJECTIVE BOX
Pt is awake, alert, lying in bed in NAD. SpO2 91% today.     INTERVAL HPI/OVERNIGHT EVENTS:      VITAL SIGNS:  T(F): 101 (03-26-20 @ 08:39)  HR: 77 (03-26-20 @ 08:39)  BP: 155/81 (03-26-20 @ 08:39)  RR: 22 (03-26-20 @ 08:39)  SpO2: 91% (03-26-20 @ 08:39)  Wt(kg): --  I&O's Detail    25 Mar 2020 07:01  -  26 Mar 2020 07:00  --------------------------------------------------------  IN:    IV PiggyBack: 300.5 mL    lactated ringers: 250 mL  Total IN: 550.5 mL    OUT:    Voided: 1200 mL  Total OUT: 1200 mL    Total NET: -649.5 mL    REVIEW OF SYSTEMS:    CONSTITUTIONAL:  No fevers, chills, sweats    HEENT:  Eyes:  No diplopia or blurred vision. ENT:  No earache, sore throat or runny nose.    CARDIOVASCULAR:  No pressure, squeezing, tightness, or heaviness about the chest; no palpitations.    RESPIRATORY:  Per HPI    GASTROINTESTINAL:  No abdominal pain, nausea, vomiting or diarrhea.    GENITOURINARY:  No dysuria, frequency or urgency.    NEUROLOGIC:  No paresthesias, fasciculations, seizures or weakness.    PSYCHIATRIC:  No disorder of thought or mood.      PHYSICAL EXAM:    Constitutional: Well developed and nourished  Eyes: Perrla  ENMT: normal  Neck: supple  Respiratory: good air entry  Cardiovascular: S1 S2 regular  Gastrointestinal: Soft, Non tender  Extremities: No edema  Vascular: normal  Neurological: Awake, alert,Ox3  Musculoskeletal: Normal    MEDICATIONS  (STANDING):  ascorbic acid 500 milliGRAM(s) Oral two times a day  azithromycin   Tablet 500 milliGRAM(s) Oral daily  cefTRIAXone   IVPB 1000 milliGRAM(s) IV Intermittent every 24 hours  cholecalciferol 1000 Unit(s) Oral daily  enoxaparin Injectable 40 milliGRAM(s) SubCutaneous daily  lactated ringers 1000 milliLiter(s) (100 mL/Hr) IV Continuous <Continuous>  potassium phosphate IVPB 30 milliMole(s) IV Intermittent once    MEDICATIONS  (PRN):  acetaminophen   Tablet .. 650 milliGRAM(s) Oral every 6 hours PRN Temp greater or equal to 38C (100.4F), Moderate Pain (4 - 6)  ALBUTerol    90 MICROgram(s) HFA Inhaler 2 Puff(s) Inhalation every 6 hours PRN Bronchospasm  guaiFENesin   Syrup  (Sugar-Free) 200 milliGRAM(s) Oral every 6 hours PRN Cough  ondansetron Injectable 4 milliGRAM(s) IV Push every 4 hours PRN Nausea and/or Vomiting      Allergies    No Known Allergies    Intolerances        LABS:                        13.8   8.13  )-----------( 241      ( 26 Mar 2020 07:33 )             40.8     03-26    138  |  104  |  10  ----------------------------<  104<H>  4.0   |  27  |  0.86    Ca    8.2<L>      26 Mar 2020 07:33  Phos  1.8     03-26  Mg     2.2     03-26    CARDIAC MARKERS ( 25 Mar 2020 08:12 )  x     / x     / 280 U/L / x     / x          CAPILLARY BLOOD GLUCOSE    RADIOLOGY & ADDITIONAL TESTS:    CXR:  < from: Xray Chest 2 Views PA/Lat (03.22.20 @ 15:38) >  IMPRESSION:  Patchy airspace opacities noted in the left upper lobe and right lower lobe suspicious for multifocal pneumonia.    < end of copied text >    Ct scan chest:  < from: CT Abdomen and Pelvis No Cont (03.22.20 @ 15:27) >  IMPRESSION:     Mural thickening of the urinary bladder, possibly due to underdistention. Underlying cystitis is not excluded.      < end of copied text >    ekg;    echo: Pt is awake, alert, lying in bed in NAD. SpO2 91% today. Has nausea and diarrhea. No significant sob. +body aches    INTERVAL HPI/OVERNIGHT EVENTS:      VITAL SIGNS:  T(F): 101 (03-26-20 @ 08:39)  HR: 77 (03-26-20 @ 08:39)  BP: 155/81 (03-26-20 @ 08:39)  RR: 22 (03-26-20 @ 08:39)  SpO2: 91% (03-26-20 @ 08:39)  Wt(kg): --  I&O's Detail    25 Mar 2020 07:01  -  26 Mar 2020 07:00  --------------------------------------------------------  IN:    IV PiggyBack: 300.5 mL    lactated ringers: 250 mL  Total IN: 550.5 mL    OUT:    Voided: 1200 mL  Total OUT: 1200 mL    Total NET: -649.5 mL    REVIEW OF SYSTEMS:    CONSTITUTIONAL:  No fevers, chills, sweats    HEENT:  Eyes:  No diplopia or blurred vision. ENT:  No earache, sore throat or runny nose.    CARDIOVASCULAR:  No pressure, squeezing, tightness, or heaviness about the chest; no palpitations.    RESPIRATORY:  Per HPI    GASTROINTESTINAL:  No abdominal pain, nausea, vomiting or diarrhea.    GENITOURINARY:  No dysuria, frequency or urgency.    NEUROLOGIC:  No paresthesias, fasciculations, seizures or weakness.    PSYCHIATRIC:  No disorder of thought or mood.      PHYSICAL EXAM:    Constitutional: Well developed and nourished  Eyes: Perrla  ENMT: normal  Neck: supple  Respiratory: good air entry  Cardiovascular: S1 S2 regular  Gastrointestinal: Soft, Non tender  Extremities: No edema  Vascular: normal  Neurological: Awake, alert,Ox3  Musculoskeletal: Normal    MEDICATIONS  (STANDING):  ascorbic acid 500 milliGRAM(s) Oral two times a day  azithromycin   Tablet 500 milliGRAM(s) Oral daily  cefTRIAXone   IVPB 1000 milliGRAM(s) IV Intermittent every 24 hours  cholecalciferol 1000 Unit(s) Oral daily  enoxaparin Injectable 40 milliGRAM(s) SubCutaneous daily  lactated ringers 1000 milliLiter(s) (100 mL/Hr) IV Continuous <Continuous>  potassium phosphate IVPB 30 milliMole(s) IV Intermittent once    MEDICATIONS  (PRN):  acetaminophen   Tablet .. 650 milliGRAM(s) Oral every 6 hours PRN Temp greater or equal to 38C (100.4F), Moderate Pain (4 - 6)  ALBUTerol    90 MICROgram(s) HFA Inhaler 2 Puff(s) Inhalation every 6 hours PRN Bronchospasm  guaiFENesin   Syrup  (Sugar-Free) 200 milliGRAM(s) Oral every 6 hours PRN Cough  ondansetron Injectable 4 milliGRAM(s) IV Push every 4 hours PRN Nausea and/or Vomiting      Allergies    No Known Allergies    Intolerances        LABS:                        13.8   8.13  )-----------( 241      ( 26 Mar 2020 07:33 )             40.8     03-26    138  |  104  |  10  ----------------------------<  104<H>  4.0   |  27  |  0.86    Ca    8.2<L>      26 Mar 2020 07:33  Phos  1.8     03-26  Mg     2.2     03-26    CARDIAC MARKERS ( 25 Mar 2020 08:12 )  x     / x     / 280 U/L / x     / x          CAPILLARY BLOOD GLUCOSE    RADIOLOGY & ADDITIONAL TESTS:    CXR:  < from: Xray Chest 2 Views PA/Lat (03.22.20 @ 15:38) >  IMPRESSION:  Patchy airspace opacities noted in the left upper lobe and right lower lobe suspicious for multifocal pneumonia.    < end of copied text >    Ct scan chest:  < from: CT Abdomen and Pelvis No Cont (03.22.20 @ 15:27) >  IMPRESSION:     Mural thickening of the urinary bladder, possibly due to underdistention. Underlying cystitis is not excluded.      < end of copied text >    ekg;    echo:

## 2020-03-26 NOTE — PROGRESS NOTE ADULT - PROBLEM SELECTOR PLAN 1
p/w fever, diarrhea   - Flu: neg   - CXR: multifocal PNA   Ed course; Rocephin and Azithro   - Will c/w Rocephin and Azithro to cover for CAP  - Covid 19 positive: contact and airborne isolation precaution   - Blood culture negative   - Tylenol, Albuterol Inhaler, Robitussin PRN  -monitor O2 sat; currently at 91% on room air  -f/u repeat CXR   -f/u RVP

## 2020-03-26 NOTE — PROGRESS NOTE ADULT - SUBJECTIVE AND OBJECTIVE BOX
PGY 1 Note discussed with supervising resident and primary attending    Patient is a 59y old  Male who presents with a chief complaint of hematuria and fever (25 Mar 2020 10:52)    INTERVAL HPI/OVERNIGHT EVENTS: Patient seen and examined at the bedside. Patient complains of nausea, headache and diarrhea. Has cough present but denies any shortness of breath currently. Saturating at 91% on room air. Had fever of 101.7 overnight. RVP pending.     MEDICATIONS  (STANDING):  azithromycin   Tablet 500 milliGRAM(s) Oral daily  cefTRIAXone   IVPB 1000 milliGRAM(s) IV Intermittent every 24 hours  cholecalciferol 1000 Unit(s) Oral daily  enoxaparin Injectable 40 milliGRAM(s) SubCutaneous daily  lactated ringers 1000 milliLiter(s) (100 mL/Hr) IV Continuous <Continuous>  potassium phosphate IVPB 30 milliMole(s) IV Intermittent once    MEDICATIONS  (PRN):  acetaminophen   Tablet .. 650 milliGRAM(s) Oral every 6 hours PRN Temp greater or equal to 38C (100.4F), Moderate Pain (4 - 6)  ALBUTerol    90 MICROgram(s) HFA Inhaler 2 Puff(s) Inhalation every 6 hours PRN Bronchospasm  guaiFENesin   Syrup  (Sugar-Free) 200 milliGRAM(s) Oral every 6 hours PRN Cough      __________________________________________________  REVIEW OF SYSTEMS:  CONSTITUTIONAL: No fever  EYES: no visual disturbances  NECK: No pain   RESPIRATORY: Cough; No shortness of breath  CARDIOVASCULAR: No chest pain  GASTROINTESTINAL: No pain. Nausea; no vomiting; diarrhea   NEUROLOGICAL: Headache  MUSCULOSKELETAL: No joint pain, no muscle pain  GENITOURINARY: no dysuria  PSYCHIATRY: no anxiety  ALL OTHER  ROS negative        Vital Signs Last 24 Hrs  T(C): 38.3 (26 Mar 2020 08:39), Max: 38.7 (25 Mar 2020 23:40)  T(F): 101 (26 Mar 2020 08:39), Max: 101.7 (25 Mar 2020 23:40)  HR: 77 (26 Mar 2020 08:39) (67 - 87)  BP: 155/81 (26 Mar 2020 08:39) (125/71 - 155/81)  RR: 22 (26 Mar 2020 08:39) (18 - 22)  SpO2: 91% (26 Mar 2020 08:39) (91% - 96%)    ________________________________________________  PHYSICAL EXAM:  GENERAL: Patient seen resting in bed and in mild distress from nausea and cough  HEENT: Normocephalic; conjunctivae and sclerae clear   NECK: supple  CHEST/LUNG: Mild crackles present   HEART: S1 S2, regular; no murmurs  ABDOMEN: Soft, Nontender, Nondistended; Bowel sounds present  EXTREMITIES: no edema; no calf tenderness  SKIN: warm and dry  NERVOUS SYSTEM: Awake and alert; Oriented to place, person and time     _________________________________________________  LABS:                        13.8   8.13  )-----------( 241      ( 26 Mar 2020 07:33 )             40.8     03-26    138  |  104  |  10  ----------------------------<  104<H>  4.0   |  27  |  0.86    Ca    8.2<L>      26 Mar 2020 07:33  Phos  1.8     03-26  Mg     2.2     03-26      RADIOLOGY & ADDITIONAL TESTS:    Imaging Personally Reviewed:  YES    No new imaging     Consultant(s) Notes Reviewed:   YES    Care Discussed with Consultants :     Plan of care was discussed with patient and /or primary care giver; all questions and concerns were addressed and care was aligned with patient's wishes.

## 2020-03-27 LAB
ANION GAP SERPL CALC-SCNC: 5 MMOL/L — SIGNIFICANT CHANGE UP (ref 5–17)
BASOPHILS # BLD AUTO: 0 K/UL — SIGNIFICANT CHANGE UP (ref 0–0.2)
BASOPHILS NFR BLD AUTO: 0 % — SIGNIFICANT CHANGE UP (ref 0–2)
BUN SERPL-MCNC: 12 MG/DL — SIGNIFICANT CHANGE UP (ref 7–18)
BURR CELLS BLD QL SMEAR: PRESENT — SIGNIFICANT CHANGE UP
CALCIUM SERPL-MCNC: 7.9 MG/DL — LOW (ref 8.4–10.5)
CHLORIDE SERPL-SCNC: 109 MMOL/L — HIGH (ref 96–108)
CO2 SERPL-SCNC: 24 MMOL/L — SIGNIFICANT CHANGE UP (ref 22–31)
CREAT SERPL-MCNC: 0.83 MG/DL — SIGNIFICANT CHANGE UP (ref 0.5–1.3)
CULTURE RESULTS: SIGNIFICANT CHANGE UP
CULTURE RESULTS: SIGNIFICANT CHANGE UP
EOSINOPHIL # BLD AUTO: 0 K/UL — SIGNIFICANT CHANGE UP (ref 0–0.5)
EOSINOPHIL NFR BLD AUTO: 0 % — SIGNIFICANT CHANGE UP (ref 0–6)
GLUCOSE SERPL-MCNC: 105 MG/DL — HIGH (ref 70–99)
HCT VFR BLD CALC: 40.2 % — SIGNIFICANT CHANGE UP (ref 39–50)
HGB BLD-MCNC: 13.6 G/DL — SIGNIFICANT CHANGE UP (ref 13–17)
LYMPHOCYTES # BLD AUTO: 0.4 K/UL — LOW (ref 1–3.3)
LYMPHOCYTES # BLD AUTO: 5 % — LOW (ref 13–44)
MAGNESIUM SERPL-MCNC: 2.3 MG/DL — SIGNIFICANT CHANGE UP (ref 1.6–2.6)
MANUAL SMEAR VERIFICATION: SIGNIFICANT CHANGE UP
MCHC RBC-ENTMCNC: 30.8 PG — SIGNIFICANT CHANGE UP (ref 27–34)
MCHC RBC-ENTMCNC: 33.8 GM/DL — SIGNIFICANT CHANGE UP (ref 32–36)
MCV RBC AUTO: 91 FL — SIGNIFICANT CHANGE UP (ref 80–100)
MONOCYTES # BLD AUTO: 0.32 K/UL — SIGNIFICANT CHANGE UP (ref 0–0.9)
MONOCYTES NFR BLD AUTO: 4 % — SIGNIFICANT CHANGE UP (ref 2–14)
NEUTROPHILS # BLD AUTO: 7.22 K/UL — SIGNIFICANT CHANGE UP (ref 1.8–7.4)
NEUTROPHILS NFR BLD AUTO: 85 % — HIGH (ref 43–77)
NEUTS BAND # BLD: 6 % — SIGNIFICANT CHANGE UP (ref 0–8)
NRBC # BLD: 0 /100 — SIGNIFICANT CHANGE UP (ref 0–0)
PHOSPHATE SERPL-MCNC: 2 MG/DL — LOW (ref 2.5–4.5)
PLAT MORPH BLD: NORMAL — SIGNIFICANT CHANGE UP
PLATELET # BLD AUTO: 192 K/UL — SIGNIFICANT CHANGE UP (ref 150–400)
PLATELET COUNT - ESTIMATE: NORMAL — SIGNIFICANT CHANGE UP
POIKILOCYTOSIS BLD QL AUTO: SLIGHT — SIGNIFICANT CHANGE UP
POTASSIUM SERPL-MCNC: 4.1 MMOL/L — SIGNIFICANT CHANGE UP (ref 3.5–5.3)
POTASSIUM SERPL-SCNC: 4.1 MMOL/L — SIGNIFICANT CHANGE UP (ref 3.5–5.3)
RBC # BLD: 4.42 M/UL — SIGNIFICANT CHANGE UP (ref 4.2–5.8)
RBC # FLD: 12.4 % — SIGNIFICANT CHANGE UP (ref 10.3–14.5)
RBC BLD AUTO: ABNORMAL
SODIUM SERPL-SCNC: 138 MMOL/L — SIGNIFICANT CHANGE UP (ref 135–145)
SPECIMEN SOURCE: SIGNIFICANT CHANGE UP
SPECIMEN SOURCE: SIGNIFICANT CHANGE UP
WBC # BLD: 7.93 K/UL — SIGNIFICANT CHANGE UP (ref 3.8–10.5)
WBC # FLD AUTO: 7.93 K/UL — SIGNIFICANT CHANGE UP (ref 3.8–10.5)

## 2020-03-27 RX ORDER — POTASSIUM PHOSPHATE, MONOBASIC POTASSIUM PHOSPHATE, DIBASIC 236; 224 MG/ML; MG/ML
15 INJECTION, SOLUTION INTRAVENOUS ONCE
Refills: 0 | Status: COMPLETED | OUTPATIENT
Start: 2020-03-27 | End: 2020-03-27

## 2020-03-27 RX ADMIN — ENOXAPARIN SODIUM 40 MILLIGRAM(S): 100 INJECTION SUBCUTANEOUS at 11:42

## 2020-03-27 RX ADMIN — Medication 650 MILLIGRAM(S): at 13:25

## 2020-03-27 RX ADMIN — Medication 200 MILLIGRAM(S): at 23:45

## 2020-03-27 RX ADMIN — Medication 650 MILLIGRAM(S): at 14:25

## 2020-03-27 RX ADMIN — Medication 1000 UNIT(S): at 11:43

## 2020-03-27 RX ADMIN — Medication 500 MILLIGRAM(S): at 05:57

## 2020-03-27 RX ADMIN — Medication 650 MILLIGRAM(S): at 23:45

## 2020-03-27 RX ADMIN — Medication 200 MILLIGRAM(S): at 17:01

## 2020-03-27 RX ADMIN — POTASSIUM PHOSPHATE, MONOBASIC POTASSIUM PHOSPHATE, DIBASIC 62.5 MILLIMOLE(S): 236; 224 INJECTION, SOLUTION INTRAVENOUS at 11:42

## 2020-03-27 RX ADMIN — Medication 650 MILLIGRAM(S): at 02:23

## 2020-03-27 RX ADMIN — Medication 650 MILLIGRAM(S): at 03:30

## 2020-03-27 RX ADMIN — Medication 400 MILLIGRAM(S): at 05:56

## 2020-03-27 NOTE — PROGRESS NOTE ADULT - ASSESSMENT
Assessment and Recommendation: 1  Problem: Covid-19 + Recommendation: with B/L PNA.   Continue Antibiotics  Oxygen Supp prn  Monitor SpO2.  Monitor temp and WBC.  Fluids for dehydration 2nd to diarrhea and fever.  Zofran IV for nausea   Albuterol inhaler PRN.   Tylenol and Robitussin PRN   Isolation precautions    Problem/Recommendation - 2:  ·  Problem: HTN (hypertension).  Recommendation: monitor BP  Cont meds.     Problem/Recommendation - 3:  ·  Problem: HANNAH (acute kidney injury).  Recommendation: Avoid nephrotoxic drugs  Monitor BMP  IVF  CT abdomen/pelvis noted - urinary bladder mural thickening.    eval Assessment and Recommendation: 1  Problem: Covid-19 + Recommendation: with B/L PNA.   Continue Antibiotics  Oxygen Supp   Monitor SpO2.  Monitor temp and WBC.  Fluids for dehydration 2nd to diarrhea and fever.  Zofran IV for nausea   Albuterol inhaler PRN.   Tylenol and Robitussin PRN   Isolation precautions    Problem/Recommendation - 2:  ·  Problem: HTN (hypertension).  Recommendation: monitor BP  Cont meds.     Problem/Recommendation - 3:  ·  Problem: HANNAH (acute kidney injury).  Improved  Monitor BMP  IVF  CT abdomen/pelvis noted - urinary bladder mural thickening.    eval

## 2020-03-27 NOTE — PROGRESS NOTE ADULT - SUBJECTIVE AND OBJECTIVE BOX
Pt is awake, alert, lying in bed in NAD. SpO2 96%. On isolation precautions.     INTERVAL HPI/OVERNIGHT EVENTS:      VITAL SIGNS:  T(F): 98.3 (03-27-20 @ 08:15)  HR: 58 (03-27-20 @ 08:15)  BP: 129/67 (03-27-20 @ 08:15)  RR: 18 (03-27-20 @ 08:15)  SpO2: 96% (03-27-20 @ 08:15)  Wt(kg): --  I&O's Detail    26 Mar 2020 07:01  -  27 Mar 2020 07:00  --------------------------------------------------------  IN:    dextrose 5% + sodium chloride 0.9%.: 770 mL  Total IN: 770 mL    OUT:  Total OUT: 0 mL    Total NET: 770 mL              REVIEW OF SYSTEMS:    CONSTITUTIONAL:  No fevers, chills, sweats    HEENT:  Eyes:  No diplopia or blurred vision. ENT:  No earache, sore throat or runny nose.    CARDIOVASCULAR:  No pressure, squeezing, tightness, or heaviness about the chest; no palpitations.    RESPIRATORY:  Per HPI    GASTROINTESTINAL:  No abdominal pain, nausea, vomiting or diarrhea.    GENITOURINARY:  No dysuria, frequency or urgency.    NEUROLOGIC:  No paresthesias, fasciculations, seizures or weakness.    PSYCHIATRIC:  No disorder of thought or mood.      PHYSICAL EXAM:    Constitutional: Well developed and nourished  Eyes:Perrla  ENMT: normal  Neck:supple  Respiratory: good air entry  Cardiovascular: S1 S2 regular  Gastrointestinal: Soft, Non tender  Extremities: No edema  Vascular: normal  Neurological: Awake, alert,Ox3  Musculoskeletal: Normal      MEDICATIONS  (STANDING):  ascorbic acid 500 milliGRAM(s) Oral two times a day  cholecalciferol 1000 Unit(s) Oral daily  dextrose 5% + sodium chloride 0.9%. 1000 milliLiter(s) (70 mL/Hr) IV Continuous <Continuous>  enoxaparin Injectable 40 milliGRAM(s) SubCutaneous daily  hydroxychloroquine 200 milliGRAM(s) Oral every 12 hours  lactated ringers 1000 milliLiter(s) (100 mL/Hr) IV Continuous <Continuous>  potassium phosphate IVPB 15 milliMole(s) IV Intermittent once    MEDICATIONS  (PRN):  acetaminophen   Tablet .. 650 milliGRAM(s) Oral every 6 hours PRN Temp greater or equal to 38C (100.4F), Moderate Pain (4 - 6)  ALBUTerol    90 MICROgram(s) HFA Inhaler 2 Puff(s) Inhalation every 6 hours PRN Bronchospasm  guaiFENesin   Syrup  (Sugar-Free) 200 milliGRAM(s) Oral every 6 hours PRN Cough  ondansetron Injectable 4 milliGRAM(s) IV Push every 4 hours PRN Nausea and/or Vomiting      Allergies    No Known Allergies    Intolerances        LABS:                        13.6   7.93  )-----------( x        ( 27 Mar 2020 07:53 )             40.2     03-27    138  |  109<H>  |  12  ----------------------------<  105<H>  4.1   |  24  |  0.83    Ca    7.9<L>      27 Mar 2020 07:53  Phos  2.0     03-27  Mg     2.3     03-27                CAPILLARY BLOOD GLUCOSE            RADIOLOGY & ADDITIONAL TESTS:    CXR:  < from: Xray Chest 1 View- PORTABLE-Routine (03.26.20 @ 13:25) >  IMPRESSION: Increasing infiltrates.    < end of copied text >    Ct scan chest:    ekg;    echo: Pt is awake, alert, lying in bed in NAD. SpO2 96%. On isolation precautions. No diarrhea today but abdominal cramps. No cough but mild sob    INTERVAL HPI/OVERNIGHT EVENTS:      VITAL SIGNS:  T(F): 98.3 (03-27-20 @ 08:15)  HR: 58 (03-27-20 @ 08:15)  BP: 129/67 (03-27-20 @ 08:15)  RR: 18 (03-27-20 @ 08:15)  SpO2: 96% (03-27-20 @ 08:15)  Wt(kg): --  I&O's Detail    26 Mar 2020 07:01  -  27 Mar 2020 07:00  --------------------------------------------------------  IN:    dextrose 5% + sodium chloride 0.9%.: 770 mL  Total IN: 770 mL    OUT:  Total OUT: 0 mL    Total NET: 770 mL              REVIEW OF SYSTEMS:    CONSTITUTIONAL:  No fevers, chills, sweats    HEENT:  Eyes:  No diplopia or blurred vision. ENT:  No earache, sore throat or runny nose.    CARDIOVASCULAR:  No pressure, squeezing, tightness, or heaviness about the chest; no palpitations.    RESPIRATORY:  Per HPI    GASTROINTESTINAL:  No abdominal pain, nausea, vomiting or diarrhea.    GENITOURINARY:  No dysuria, frequency or urgency.    NEUROLOGIC:  No paresthesias, fasciculations, seizures or weakness.    PSYCHIATRIC:  No disorder of thought or mood.      PHYSICAL EXAM:    Constitutional: Well developed and nourished  Eyes:Perrla  ENMT: normal  Neck:supple  Respiratory: good air entry  Cardiovascular: S1 S2 regular  Gastrointestinal: Soft, Non tender  Extremities: No edema  Vascular: normal  Neurological: Awake, alert,Ox3  Musculoskeletal: Normal      MEDICATIONS  (STANDING):  ascorbic acid 500 milliGRAM(s) Oral two times a day  cholecalciferol 1000 Unit(s) Oral daily  dextrose 5% + sodium chloride 0.9%. 1000 milliLiter(s) (70 mL/Hr) IV Continuous <Continuous>  enoxaparin Injectable 40 milliGRAM(s) SubCutaneous daily  hydroxychloroquine 200 milliGRAM(s) Oral every 12 hours  lactated ringers 1000 milliLiter(s) (100 mL/Hr) IV Continuous <Continuous>  potassium phosphate IVPB 15 milliMole(s) IV Intermittent once    MEDICATIONS  (PRN):  acetaminophen   Tablet .. 650 milliGRAM(s) Oral every 6 hours PRN Temp greater or equal to 38C (100.4F), Moderate Pain (4 - 6)  ALBUTerol    90 MICROgram(s) HFA Inhaler 2 Puff(s) Inhalation every 6 hours PRN Bronchospasm  guaiFENesin   Syrup  (Sugar-Free) 200 milliGRAM(s) Oral every 6 hours PRN Cough  ondansetron Injectable 4 milliGRAM(s) IV Push every 4 hours PRN Nausea and/or Vomiting      Allergies    No Known Allergies    Intolerances        LABS:                        13.6   7.93  )-----------( x        ( 27 Mar 2020 07:53 )             40.2     03-27    138  |  109<H>  |  12  ----------------------------<  105<H>  4.1   |  24  |  0.83    Ca    7.9<L>      27 Mar 2020 07:53  Phos  2.0     03-27  Mg     2.3     03-27                CAPILLARY BLOOD GLUCOSE            RADIOLOGY & ADDITIONAL TESTS:    CXR:  < from: Xray Chest 1 View- PORTABLE-Routine (03.26.20 @ 13:25) >  IMPRESSION: Increasing infiltrates.    < end of copied text >    Ct scan chest:    ekg;    echo:

## 2020-03-27 NOTE — PROGRESS NOTE ADULT - SUBJECTIVE AND OBJECTIVE BOX
Patient is a 59y old  Male who presents with a chief complaint of hematuria and fever (26 Mar 2020 11:41)    pt seen in tele [ x ], reg med floor [   ], bed [ x ], chair at bedside [   ], a+o x3 [x  ], lethargic [  ],  nad [ x ]     pt states feeling better      Allergies    No Known Allergies        Vitals    T(F): 98.3 (03-27-20 @ 08:15), Max: 102 (03-26-20 @ 11:57)  HR: 58 (03-27-20 @ 08:15) (58 - 88)  BP: 129/67 (03-27-20 @ 08:15) (127/72 - 140/75)  RR: 18 (03-27-20 @ 08:15) (18 - 20)  SpO2: 96% (03-27-20 @ 08:15) (92% - 98%)  Wt(kg): --  CAPILLARY BLOOD GLUCOSE          Labs                          13.6   7.93  )-----------( x        ( 27 Mar 2020 07:53 )             40.2       03-27    138  |  109<H>  |  12  ----------------------------<  105<H>  4.1   |  24  |  0.83    Ca    7.9<L>      27 Mar 2020 07:53  Phos  2.0     03-27  Mg     2.3     03-27              .Urine  03-22 @ 21:59   No growth  --  --          Radiology Results    < from: Xray Chest 1 View- PORTABLE-Routine (03.26.20 @ 13:25) >  IMPRESSION: Increasing infiltrates.    < end of copied text >        Meds    MEDICATIONS  (STANDING):  ascorbic acid 500 milliGRAM(s) Oral two times a day  cholecalciferol 1000 Unit(s) Oral daily  dextrose 5% + sodium chloride 0.9%. 1000 milliLiter(s) (70 mL/Hr) IV Continuous <Continuous>  enoxaparin Injectable 40 milliGRAM(s) SubCutaneous daily  hydroxychloroquine 200 milliGRAM(s) Oral every 12 hours  lactated ringers 1000 milliLiter(s) (100 mL/Hr) IV Continuous <Continuous>  potassium phosphate IVPB 15 milliMole(s) IV Intermittent once      MEDICATIONS  (PRN):  acetaminophen   Tablet .. 650 milliGRAM(s) Oral every 6 hours PRN Temp greater or equal to 38C (100.4F), Moderate Pain (4 - 6)  ALBUTerol    90 MICROgram(s) HFA Inhaler 2 Puff(s) Inhalation every 6 hours PRN Bronchospasm  guaiFENesin   Syrup  (Sugar-Free) 200 milliGRAM(s) Oral every 6 hours PRN Cough  ondansetron Injectable 4 milliGRAM(s) IV Push every 4 hours PRN Nausea and/or Vomiting      Physical Exam      Neuro :  no focal deficits  Respiratory: CTA B/L  CV: RRR, S1S2, no murmurs,   Abdominal: Soft, NT, ND +BS,  Extremities: No edema, + peripheral pulses    ASSESSMENT    multifocal Pneumonia due to organism   r/o covid 19   mild rhabdomyolysis  s/p juno  s/p hyponatremia  h/o HTN (hypertension)  H/O colectomy        PLAN      d/c roceph   to complete 5 day zithromax  rsv neg noted above  covid -19 positive   tmx 102.3  current temp 98.3  O2 sat on 2l n/c 96% ( range 91-96)  cont plaquenil 200mg bid x day1/4  contact and airborne isolation   rept cxr with increasing infilt noted above  cont albuterol inhaler   procalcitonin neg noted above  cont supportive care with tylenol prn, robitussin prn and O2 via nasal canula if needed   blood and ucx neg  pulm f/u   ct abd with Mural thickening of the urinary bladder, possibly due to underdistention. Underlying cystitis is not excluded noted above.   doubt hematuria at present as ua neg for rbc's or infectious process  elevated ck improving noted above  d/c ivf  serum creat wnl noted above  renal f/u   cont current meds

## 2020-03-27 NOTE — PROGRESS NOTE ADULT - PROBLEM SELECTOR PLAN 1
p/w fever, diarrhea   - Flu: neg   - CXR: multifocal PNA   Ed course; Rocephin and Azithro   - DC Rocephin; completed course of Azithro of 5 days  - Covid 19 positive: contact and airborne isolation precaution   - Blood culture negative   - Tylenol, Albuterol Inhaler, Robitussin PRN  -monitor O2 sat; currently at 96% on 2L nasal cannula  -repeat CXR showing increased infiltrates   -RVP negative  -started on hydroxychloroquine yesterday

## 2020-03-27 NOTE — PROGRESS NOTE ADULT - SUBJECTIVE AND OBJECTIVE BOX
PGY 1 Note discussed with supervising resident and primary attending    Patient is a 59y old  Male who presents with a chief complaint of hematuria and fever (27 Mar 2020 10:32)    INTERVAL HPI/OVERNIGHT EVENTS: Patient seen and examined at the bedside. Patient noted to have increasing infiltrates on repeat chest x-ray. On 2L nasal cannula and saturating at 96%. Denies any shortness of breath or cough at this time. Continues to have poor appetite.     MEDICATIONS  (STANDING):  ascorbic acid 500 milliGRAM(s) Oral two times a day  cholecalciferol 1000 Unit(s) Oral daily  dextrose 5% + sodium chloride 0.9%. 1000 milliLiter(s) (70 mL/Hr) IV Continuous <Continuous>  enoxaparin Injectable 40 milliGRAM(s) SubCutaneous daily  hydroxychloroquine 200 milliGRAM(s) Oral every 12 hours  lactated ringers 1000 milliLiter(s) (100 mL/Hr) IV Continuous <Continuous>  potassium phosphate IVPB 15 milliMole(s) IV Intermittent once    MEDICATIONS  (PRN):  acetaminophen   Tablet .. 650 milliGRAM(s) Oral every 6 hours PRN Temp greater or equal to 38C (100.4F), Moderate Pain (4 - 6)  ALBUTerol    90 MICROgram(s) HFA Inhaler 2 Puff(s) Inhalation every 6 hours PRN Bronchospasm  guaiFENesin   Syrup  (Sugar-Free) 200 milliGRAM(s) Oral every 6 hours PRN Cough  ondansetron Injectable 4 milliGRAM(s) IV Push every 4 hours PRN Nausea and/or Vomiting      __________________________________________________  REVIEW OF SYSTEMS:  CONSTITUTIONAL: No fever  EYES: no visual disturbances  NECK: No pain   RESPIRATORY: No cough; No shortness of breath  CARDIOVASCULAR: No chest pain  GASTROINTESTINAL: No pain. No nausea or vomiting   NEUROLOGICAL: No headache   MUSCULOSKELETAL: No joint pain, no muscle pain  GENITOURINARY: no dysuria  PSYCHIATRY: no anxiety  ALL OTHER  ROS negative        Vital Signs Last 24 Hrs  T(C): 36.8 (27 Mar 2020 08:15), Max: 38.9 (26 Mar 2020 11:57)  T(F): 98.3 (27 Mar 2020 08:15), Max: 102 (26 Mar 2020 11:57)  HR: 58 (27 Mar 2020 08:15) (58 - 88)  BP: 129/67 (27 Mar 2020 08:15) (127/72 - 140/75)  RR: 18 (27 Mar 2020 08:15) (18 - 20)  SpO2: 96% (27 Mar 2020 08:15) (92% - 98%)    ________________________________________________  PHYSICAL EXAM:  GENERAL: Patient seen resting in bed and in no acute distress  HEENT: Normocephalic; conjunctivae and sclerae clear  NECK: supple  CHEST/LUNG: Crackles present to auscultation bilaterally    HEART: S1 S2, regular; no murmurs  ABDOMEN: Soft, Nontender, Nondistended; Bowel sounds present  EXTREMITIES: no edema; no calf tenderness  SKIN: warm and dry  NERVOUS SYSTEM: Awake and alert; Oriented to place, person and time     _________________________________________________  LABS:                        13.6   7.93  )-----------( x        ( 27 Mar 2020 07:53 )             40.2     03-27    138  |  109<H>  |  12  ----------------------------<  105<H>  4.1   |  24  |  0.83    Ca    7.9<L>      27 Mar 2020 07:53  Phos  2.0     03-27  Mg     2.3     03-27      RADIOLOGY & ADDITIONAL TESTS:    Imaging Personally Reviewed:  YES    < from: Xray Chest 1 View- PORTABLE-Routine (03.26.20 @ 13:25) >  IMPRESSION: Increasing infiltrates.    < end of copied text >    Consultant(s) Notes Reviewed:   YES    Care Discussed with Consultants :     Plan of care was discussed with patient and /or primary care giver; all questions and concerns were addressed and care was aligned with patient's wishes.

## 2020-03-28 LAB
ANION GAP SERPL CALC-SCNC: 7 MMOL/L — SIGNIFICANT CHANGE UP (ref 5–17)
ANISOCYTOSIS BLD QL: SLIGHT — SIGNIFICANT CHANGE UP
BASOPHILS # BLD AUTO: 0 K/UL — SIGNIFICANT CHANGE UP (ref 0–0.2)
BASOPHILS NFR BLD AUTO: 0 % — SIGNIFICANT CHANGE UP (ref 0–2)
BUN SERPL-MCNC: 12 MG/DL — SIGNIFICANT CHANGE UP (ref 7–18)
CALCIUM SERPL-MCNC: 8.5 MG/DL — SIGNIFICANT CHANGE UP (ref 8.4–10.5)
CHLORIDE SERPL-SCNC: 108 MMOL/L — SIGNIFICANT CHANGE UP (ref 96–108)
CO2 SERPL-SCNC: 26 MMOL/L — SIGNIFICANT CHANGE UP (ref 22–31)
CREAT SERPL-MCNC: 0.95 MG/DL — SIGNIFICANT CHANGE UP (ref 0.5–1.3)
EOSINOPHIL # BLD AUTO: 0 K/UL — SIGNIFICANT CHANGE UP (ref 0–0.5)
EOSINOPHIL NFR BLD AUTO: 0 % — SIGNIFICANT CHANGE UP (ref 0–6)
GLUCOSE SERPL-MCNC: 87 MG/DL — SIGNIFICANT CHANGE UP (ref 70–99)
HCT VFR BLD CALC: 40.4 % — SIGNIFICANT CHANGE UP (ref 39–50)
HGB BLD-MCNC: 13.5 G/DL — SIGNIFICANT CHANGE UP (ref 13–17)
HYPOCHROMIA BLD QL: SLIGHT — SIGNIFICANT CHANGE UP
LYMPHOCYTES # BLD AUTO: 0.95 K/UL — LOW (ref 1–3.3)
LYMPHOCYTES # BLD AUTO: 11 % — LOW (ref 13–44)
MAGNESIUM SERPL-MCNC: 2.6 MG/DL — SIGNIFICANT CHANGE UP (ref 1.6–2.6)
MANUAL SMEAR VERIFICATION: SIGNIFICANT CHANGE UP
MCHC RBC-ENTMCNC: 30.4 PG — SIGNIFICANT CHANGE UP (ref 27–34)
MCHC RBC-ENTMCNC: 33.4 GM/DL — SIGNIFICANT CHANGE UP (ref 32–36)
MCV RBC AUTO: 91 FL — SIGNIFICANT CHANGE UP (ref 80–100)
MONOCYTES # BLD AUTO: 0.6 K/UL — SIGNIFICANT CHANGE UP (ref 0–0.9)
MONOCYTES NFR BLD AUTO: 7 % — SIGNIFICANT CHANGE UP (ref 2–14)
NEUTROPHILS # BLD AUTO: 6.71 K/UL — SIGNIFICANT CHANGE UP (ref 1.8–7.4)
NEUTROPHILS NFR BLD AUTO: 78 % — HIGH (ref 43–77)
NRBC # BLD: 0 /100 — SIGNIFICANT CHANGE UP (ref 0–0)
PHOSPHATE SERPL-MCNC: 2.4 MG/DL — LOW (ref 2.5–4.5)
PLAT MORPH BLD: NORMAL — SIGNIFICANT CHANGE UP
PLATELET # BLD AUTO: 357 K/UL — SIGNIFICANT CHANGE UP (ref 150–400)
POIKILOCYTOSIS BLD QL AUTO: SLIGHT — SIGNIFICANT CHANGE UP
POLYCHROMASIA BLD QL SMEAR: SLIGHT — SIGNIFICANT CHANGE UP
POTASSIUM SERPL-MCNC: 4.4 MMOL/L — SIGNIFICANT CHANGE UP (ref 3.5–5.3)
POTASSIUM SERPL-SCNC: 4.4 MMOL/L — SIGNIFICANT CHANGE UP (ref 3.5–5.3)
RBC # BLD: 4.44 M/UL — SIGNIFICANT CHANGE UP (ref 4.2–5.8)
RBC # FLD: 12.3 % — SIGNIFICANT CHANGE UP (ref 10.3–14.5)
RBC BLD AUTO: ABNORMAL
SODIUM SERPL-SCNC: 141 MMOL/L — SIGNIFICANT CHANGE UP (ref 135–145)
VARIANT LYMPHS # BLD: 4 % — SIGNIFICANT CHANGE UP (ref 0–6)
WBC # BLD: 8.6 K/UL — SIGNIFICANT CHANGE UP (ref 3.8–10.5)
WBC # FLD AUTO: 8.6 K/UL — SIGNIFICANT CHANGE UP (ref 3.8–10.5)

## 2020-03-28 RX ADMIN — Medication 650 MILLIGRAM(S): at 00:44

## 2020-03-28 RX ADMIN — ENOXAPARIN SODIUM 40 MILLIGRAM(S): 100 INJECTION SUBCUTANEOUS at 12:28

## 2020-03-28 RX ADMIN — Medication 200 MILLIGRAM(S): at 09:09

## 2020-03-28 RX ADMIN — Medication 200 MILLIGRAM(S): at 17:02

## 2020-03-28 RX ADMIN — Medication 1000 UNIT(S): at 12:28

## 2020-03-28 RX ADMIN — Medication 500 MILLIGRAM(S): at 05:46

## 2020-03-28 RX ADMIN — Medication 500 MILLIGRAM(S): at 17:02

## 2020-03-28 RX ADMIN — Medication 200 MILLIGRAM(S): at 05:46

## 2020-03-28 RX ADMIN — ALBUTEROL 2 PUFF(S): 90 AEROSOL, METERED ORAL at 17:51

## 2020-03-28 NOTE — DIETITIAN INITIAL EVALUATION ADULT. - PERTINENT MEDS FT
MEDICATIONS  (STANDING):  ascorbic acid 500 milliGRAM(s) Oral two times a day  cholecalciferol 1000 Unit(s) Oral daily  dextrose 5% + sodium chloride 0.9%. 1000 milliLiter(s) (70 mL/Hr) IV Continuous <Continuous>  enoxaparin Injectable 40 milliGRAM(s) SubCutaneous daily  hydroxychloroquine 200 milliGRAM(s) Oral every 12 hours  lactated ringers 1000 milliLiter(s) (100 mL/Hr) IV Continuous <Continuous>

## 2020-03-28 NOTE — PROGRESS NOTE ADULT - SUBJECTIVE AND OBJECTIVE BOX
Patient is a 59y old  Male who presents with a chief complaint of hematuria and fever (28 Mar 2020 06:41)    Pt is awake, alert, lying in bed in NAD. On isolation precautions. No diarrhea today but abdominal cramps. No cough but mild sob. No new complaints.    INTERVAL HPI/OVERNIGHT EVENTS:      VITAL SIGNS:  T(F): 98.5 (03-28-20 @ 12:46)  HR: 52 (03-28-20 @ 12:46)  BP: 137/84 (03-28-20 @ 12:46)  RR: 18 (03-28-20 @ 12:46)  SpO2: 94% (03-28-20 @ 12:46)  Wt(kg): --  I&O's Detail    27 Mar 2020 07:01  -  28 Mar 2020 07:00  --------------------------------------------------------  IN:    Oral Fluid: 230 mL  Total IN: 230 mL    OUT:  Total OUT: 0 mL    Total NET: 230 mL              REVIEW OF SYSTEMS:    CONSTITUTIONAL:  No fevers, chills, sweats    HEENT:  Eyes:  No diplopia or blurred vision. ENT:  No earache, sore throat or runny nose.    CARDIOVASCULAR:  No pressure, squeezing, tightness, or heaviness about the chest; no palpitations.    RESPIRATORY:  Per HPI    GASTROINTESTINAL:  No abdominal pain, nausea, vomiting or diarrhea.    GENITOURINARY:  No dysuria, frequency or urgency.    NEUROLOGIC:  No paresthesias, fasciculations, seizures or weakness.    PSYCHIATRIC:  No disorder of thought or mood.      PHYSICAL EXAM:    Constitutional: Well developed and nourished  Eyes:Perrla  ENMT: normal  Neck:supple  Respiratory: good air entry  Cardiovascular: S1 S2 regular  Gastrointestinal: Soft, Non tender  Extremities: No edema  Vascular:normal  Neurological:Awake, alert,Ox3  Musculoskeletal:Normal      MEDICATIONS  (STANDING):  ascorbic acid 500 milliGRAM(s) Oral two times a day  cholecalciferol 1000 Unit(s) Oral daily  dextrose 5% + sodium chloride 0.9%. 1000 milliLiter(s) (70 mL/Hr) IV Continuous <Continuous>  enoxaparin Injectable 40 milliGRAM(s) SubCutaneous daily  hydroxychloroquine 200 milliGRAM(s) Oral every 12 hours  lactated ringers 1000 milliLiter(s) (100 mL/Hr) IV Continuous <Continuous>    MEDICATIONS  (PRN):  acetaminophen   Tablet .. 650 milliGRAM(s) Oral every 6 hours PRN Temp greater or equal to 38C (100.4F), Moderate Pain (4 - 6)  ALBUTerol    90 MICROgram(s) HFA Inhaler 2 Puff(s) Inhalation every 6 hours PRN Bronchospasm  guaiFENesin   Syrup  (Sugar-Free) 200 milliGRAM(s) Oral every 6 hours PRN Cough  ondansetron Injectable 4 milliGRAM(s) IV Push every 4 hours PRN Nausea and/or Vomiting      Allergies    No Known Allergies    Intolerances        LABS:                        13.5   8.60  )-----------( 357      ( 28 Mar 2020 07:11 )             40.4     03-28    141  |  108  |  12  ----------------------------<  87  4.4   |  26  |  0.95    Ca    8.5      28 Mar 2020 07:11  Phos  2.4     03-28  Mg     2.6     03-28                CAPILLARY BLOOD GLUCOSE            RADIOLOGY & ADDITIONAL TESTS:    CXR:  < from: Xray Chest 1 View- PORTABLE-Routine (03.26.20 @ 13:25) >  IMPRESSION: Increasing infiltrates.    < end of copied text >    Ct scan chest:    ekg;    echo: Patient is a 59y old  Male who presents with a chief complaint of hematuria and fever (28 Mar 2020 06:41)    Pt is awake, alert, lying in bed in NAD. Still on isolation precautions. No diarrhea but sob even at rest associated with body aches    INTERVAL HPI/OVERNIGHT EVENTS:      VITAL SIGNS:  T(F): 98.5 (03-28-20 @ 12:46)  HR: 52 (03-28-20 @ 12:46)  BP: 137/84 (03-28-20 @ 12:46)  RR: 18 (03-28-20 @ 12:46)  SpO2: 94% (03-28-20 @ 12:46)  Wt(kg): --  I&O's Detail    27 Mar 2020 07:01  -  28 Mar 2020 07:00  --------------------------------------------------------  IN:    Oral Fluid: 230 mL  Total IN: 230 mL    OUT:  Total OUT: 0 mL    Total NET: 230 mL              REVIEW OF SYSTEMS:    CONSTITUTIONAL:  No fevers, chills, sweats    HEENT:  Eyes:  No diplopia or blurred vision. ENT:  No earache, sore throat or runny nose.    CARDIOVASCULAR:  No pressure, squeezing, tightness, or heaviness about the chest; no palpitations.    RESPIRATORY:  Per HPI    GASTROINTESTINAL:  No abdominal pain, nausea, vomiting or diarrhea.    GENITOURINARY:  No dysuria, frequency or urgency.    NEUROLOGIC:  No paresthesias, fasciculations, seizures or weakness.    PSYCHIATRIC:  No disorder of thought or mood.      PHYSICAL EXAM:    Constitutional: Well developed and nourished  Eyes:Perrla  ENMT: normal  Neck:supple  Respiratory: good air entry  Cardiovascular: S1 S2 regular  Gastrointestinal: Soft, Non tender  Extremities: No edema  Vascular:normal  Neurological:Awake, alert,Ox3  Musculoskeletal:Normal      MEDICATIONS  (STANDING):  ascorbic acid 500 milliGRAM(s) Oral two times a day  cholecalciferol 1000 Unit(s) Oral daily  dextrose 5% + sodium chloride 0.9%. 1000 milliLiter(s) (70 mL/Hr) IV Continuous <Continuous>  enoxaparin Injectable 40 milliGRAM(s) SubCutaneous daily  hydroxychloroquine 200 milliGRAM(s) Oral every 12 hours  lactated ringers 1000 milliLiter(s) (100 mL/Hr) IV Continuous <Continuous>    MEDICATIONS  (PRN):  acetaminophen   Tablet .. 650 milliGRAM(s) Oral every 6 hours PRN Temp greater or equal to 38C (100.4F), Moderate Pain (4 - 6)  ALBUTerol    90 MICROgram(s) HFA Inhaler 2 Puff(s) Inhalation every 6 hours PRN Bronchospasm  guaiFENesin   Syrup  (Sugar-Free) 200 milliGRAM(s) Oral every 6 hours PRN Cough  ondansetron Injectable 4 milliGRAM(s) IV Push every 4 hours PRN Nausea and/or Vomiting      Allergies    No Known Allergies    Intolerances        LABS:                        13.5   8.60  )-----------( 357      ( 28 Mar 2020 07:11 )             40.4     03-28    141  |  108  |  12  ----------------------------<  87  4.4   |  26  |  0.95    Ca    8.5      28 Mar 2020 07:11  Phos  2.4     03-28  Mg     2.6     03-28                CAPILLARY BLOOD GLUCOSE            RADIOLOGY & ADDITIONAL TESTS:    CXR:  < from: Xray Chest 1 View- PORTABLE-Routine (03.26.20 @ 13:25) >  IMPRESSION: Increasing infiltrates.    < end of copied text >    Ct scan chest:    ekg;    echo:

## 2020-03-28 NOTE — DIETITIAN INITIAL EVALUATION ADULT. - NS FNS WEIGHT USED FOR CALC
Ht=5' 8"    CPW=471 lb+10%=169.4 lb   Admission sc=147 lb   BMI=31.3   Current il=523.1 lb 3/24/2020; 200.1 lb 3/27/2020/ideal

## 2020-03-28 NOTE — PROGRESS NOTE ADULT - SUBJECTIVE AND OBJECTIVE BOX
Patient is a 59y old  Male who presents with a chief complaint of hematuria and fever (27 Mar 2020 11:00)    pt seen in tele [ x ], reg med floor [   ], bed [ x ], chair at bedside [   ], a+o x3 [x  ], lethargic [  ],    nad [ x ]     pt states feeling better      Allergies    No Known Allergies        Vitals    T(F): 98.2 (03-28-20 @ 05:00), Max: 98.9 (03-27-20 @ 23:30)  HR: 63 (03-28-20 @ 05:00) (58 - 63)  BP: 139/81 (03-28-20 @ 05:00) (125/68 - 139/81)  RR: 19 (03-28-20 @ 05:00) (16 - 19)  SpO2: 92% (03-28-20 @ 05:00) (92% - 100%)  Wt(kg): --  CAPILLARY BLOOD GLUCOSE          Labs                          13.6   7.93  )-----------( 192      ( 27 Mar 2020 07:53 )             40.2       03-27    138  |  109<H>  |  12  ----------------------------<  105<H>  4.1   |  24  |  0.83    Ca    7.9<L>      27 Mar 2020 07:53  Phos  2.0     03-27  Mg     2.3     03-27              .Urine  03-22 @ 21:59   No growth  --  --          Radiology Results      Meds    MEDICATIONS  (STANDING):  ascorbic acid 500 milliGRAM(s) Oral two times a day  cholecalciferol 1000 Unit(s) Oral daily  dextrose 5% + sodium chloride 0.9%. 1000 milliLiter(s) (70 mL/Hr) IV Continuous <Continuous>  enoxaparin Injectable 40 milliGRAM(s) SubCutaneous daily  hydroxychloroquine 200 milliGRAM(s) Oral every 12 hours  lactated ringers 1000 milliLiter(s) (100 mL/Hr) IV Continuous <Continuous>      MEDICATIONS  (PRN):  acetaminophen   Tablet .. 650 milliGRAM(s) Oral every 6 hours PRN Temp greater or equal to 38C (100.4F), Moderate Pain (4 - 6)  ALBUTerol    90 MICROgram(s) HFA Inhaler 2 Puff(s) Inhalation every 6 hours PRN Bronchospasm  guaiFENesin   Syrup  (Sugar-Free) 200 milliGRAM(s) Oral every 6 hours PRN Cough  ondansetron Injectable 4 milliGRAM(s) IV Push every 4 hours PRN Nausea and/or Vomiting      Physical Exam    Neuro :  no focal deficits  Respiratory: CTA B/L  CV: RRR, S1S2, no murmurs,   Abdominal: Soft, NT, ND +BS,  Extremities: No edema, + peripheral pulses    ASSESSMENT    multifocal Pneumonia due to organism   r/o covid 19   mild rhabdomyolysis  s/p juno  s/p hyponatremia  h/o HTN (hypertension)  H/O colectomy        PLAN      d/c roceph   to complete 5 day zithromax  rsv neg noted above  covid -19 positive   tmx 102.3  current temp 98.3  O2 sat on 2l n/c 96% ( range 91-96)  cont plaquenil 200mg bid x day1/4  contact and airborne isolation   rept cxr with increasing infilt noted above  cont albuterol inhaler   procalcitonin neg noted above  cont supportive care with tylenol prn, robitussin prn and O2 via nasal canula if needed   blood and ucx neg  pulm f/u   ct abd with Mural thickening of the urinary bladder, possibly due to underdistention. Underlying cystitis is not excluded noted above.   doubt hematuria at present as ua neg for rbc's or infectious process  elevated ck improving noted above  d/c ivf  serum creat wnl noted above  renal f/u   cont current meds Patient is a 59y old  Male who presents with a chief complaint of hematuria and fever (27 Mar 2020 11:00)    pt seen in tele [ x ], reg med floor [   ], bed [ x ], chair at bedside [   ], a+o x3 [x  ], lethargic [  ],    nad [ x ]     pt states feeling better      Allergies    No Known Allergies        Vitals    T(F): 98.2 (03-28-20 @ 05:00), Max: 98.9 (03-27-20 @ 23:30)  HR: 63 (03-28-20 @ 05:00) (58 - 63)  BP: 139/81 (03-28-20 @ 05:00) (125/68 - 139/81)  RR: 19 (03-28-20 @ 05:00) (16 - 19)  SpO2: 92% (03-28-20 @ 05:00) (92% - 100%)  Wt(kg): --  CAPILLARY BLOOD GLUCOSE          Labs                          13.6   7.93  )-----------( 192      ( 27 Mar 2020 07:53 )             40.2       03-27    138  |  109<H>  |  12  ----------------------------<  105<H>  4.1   |  24  |  0.83    Ca    7.9<L>      27 Mar 2020 07:53  Phos  2.0     03-27  Mg     2.3     03-27              .Urine  03-22 @ 21:59   No growth  --  --          Radiology Results      Meds    MEDICATIONS  (STANDING):  ascorbic acid 500 milliGRAM(s) Oral two times a day  cholecalciferol 1000 Unit(s) Oral daily  dextrose 5% + sodium chloride 0.9%. 1000 milliLiter(s) (70 mL/Hr) IV Continuous <Continuous>  enoxaparin Injectable 40 milliGRAM(s) SubCutaneous daily  hydroxychloroquine 200 milliGRAM(s) Oral every 12 hours  lactated ringers 1000 milliLiter(s) (100 mL/Hr) IV Continuous <Continuous>      MEDICATIONS  (PRN):  acetaminophen   Tablet .. 650 milliGRAM(s) Oral every 6 hours PRN Temp greater or equal to 38C (100.4F), Moderate Pain (4 - 6)  ALBUTerol    90 MICROgram(s) HFA Inhaler 2 Puff(s) Inhalation every 6 hours PRN Bronchospasm  guaiFENesin   Syrup  (Sugar-Free) 200 milliGRAM(s) Oral every 6 hours PRN Cough  ondansetron Injectable 4 milliGRAM(s) IV Push every 4 hours PRN Nausea and/or Vomiting      Physical Exam    Neuro :  no focal deficits  Respiratory: CTA B/L  CV: RRR, S1S2, no murmurs,   Abdominal: Soft, NT, ND +BS,  Extremities: No edema, + peripheral pulses    ASSESSMENT    multifocal Pneumonia due to organism   r/o covid 19   mild rhabdomyolysis  s/p juno  s/p hyponatremia  h/o HTN (hypertension)  H/O colectomy        PLAN      complete 5 day zithromax  rsv neg noted above  covid -19 positive   afebrile  tmx 98.9  O2 sat on 2l n/c 92% ( range )  cont plaquenil 200mg bid x day2/4  contact and airborne isolation   rept cxr with increasing infilt noted above  cont albuterol inhaler   procalcitonin neg noted above  cont supportive care with tylenol prn, robitussin prn and O2 via nasal canula if needed   blood and ucx neg  pulm f/u   ct abd with Mural thickening of the urinary bladder, possibly due to underdistention. Underlying cystitis is not excluded noted above.   doubt hematuria at present as ua neg for rbc's or infectious process  elevated ck improving noted above  d/c ivf  serum creat wnl noted above  renal f/u   cont current meds

## 2020-03-28 NOTE — DIETITIAN INITIAL EVALUATION ADULT. - OTHER INFO
pt from home, COVID19+nathanael, in airborne/contact isolation room, attempt made to contact pt via [employer] Phone  ID # [id number] and later # [phone] to pt's cell phone [phone], but not available; decreased intake with diarrhea x 1wk PTA, per RN, pt not eating well today, no GI distress reported at present nutrition supplement suggested; Limited intake/wt change history data available at present; Discussed with MD/RN

## 2020-03-28 NOTE — DIETITIAN INITIAL EVALUATION ADULT. - PERTINENT LABORATORY DATA
03-28 Na141 mmol/L Glu 87 mg/dL K+ 4.4 mmol/L Cr  0.95 mg/dL BUN 12 mg/dL   03-28 Phos 2.4 mg/dL<L>   03-23 Alb 3.0 g/dL<L>     03-23 [id number] 5.7 %<H>   03-23 Chol 79 mg/dL LDL 29 mg/dL HDL 22 mg/dL<L> Trig 141 mg/dL

## 2020-03-28 NOTE — PROGRESS NOTE ADULT - PROBLEM SELECTOR PLAN 1
COVID-19  Continue Antibiotics  Oxygen Supp   Monitor SpO2.  Monitor temp and WBC.  Fluids for dehydration 2nd to diarrhea and fever.  Zofran IV for nausea   Albuterol inhaler PRN.   Tylenol and Robitussin PRN   Isolation precautions COVID-19+  Continue Antibiotics  Oxygen Supp   Monitor SpO2.  Monitor temp and WBC.  IVF  LDH, Ferritin and CRP  Zofran IV for nausea   Albuterol inhaler PRN.   Tylenol and Robitussin PRN   Isolation precautions

## 2020-03-29 LAB
ANION GAP SERPL CALC-SCNC: 4 MMOL/L — LOW (ref 5–17)
BASOPHILS # BLD AUTO: 0.02 K/UL — SIGNIFICANT CHANGE UP (ref 0–0.2)
BASOPHILS NFR BLD AUTO: 0.3 % — SIGNIFICANT CHANGE UP (ref 0–2)
BUN SERPL-MCNC: 16 MG/DL — SIGNIFICANT CHANGE UP (ref 7–18)
CALCIUM SERPL-MCNC: 8.8 MG/DL — SIGNIFICANT CHANGE UP (ref 8.4–10.5)
CHLORIDE SERPL-SCNC: 107 MMOL/L — SIGNIFICANT CHANGE UP (ref 96–108)
CO2 SERPL-SCNC: 29 MMOL/L — SIGNIFICANT CHANGE UP (ref 22–31)
CREAT SERPL-MCNC: 0.87 MG/DL — SIGNIFICANT CHANGE UP (ref 0.5–1.3)
EOSINOPHIL # BLD AUTO: 0.08 K/UL — SIGNIFICANT CHANGE UP (ref 0–0.5)
EOSINOPHIL NFR BLD AUTO: 1.1 % — SIGNIFICANT CHANGE UP (ref 0–6)
GLUCOSE SERPL-MCNC: 106 MG/DL — HIGH (ref 70–99)
HCT VFR BLD CALC: 38.6 % — LOW (ref 39–50)
HGB BLD-MCNC: 13.2 G/DL — SIGNIFICANT CHANGE UP (ref 13–17)
IMM GRANULOCYTES NFR BLD AUTO: 0.8 % — SIGNIFICANT CHANGE UP (ref 0–1.5)
LYMPHOCYTES # BLD AUTO: 1.25 K/UL — SIGNIFICANT CHANGE UP (ref 1–3.3)
LYMPHOCYTES # BLD AUTO: 17.3 % — SIGNIFICANT CHANGE UP (ref 13–44)
MAGNESIUM SERPL-MCNC: 2.4 MG/DL — SIGNIFICANT CHANGE UP (ref 1.6–2.6)
MCHC RBC-ENTMCNC: 31 PG — SIGNIFICANT CHANGE UP (ref 27–34)
MCHC RBC-ENTMCNC: 34.2 GM/DL — SIGNIFICANT CHANGE UP (ref 32–36)
MCV RBC AUTO: 90.6 FL — SIGNIFICANT CHANGE UP (ref 80–100)
MONOCYTES # BLD AUTO: 0.54 K/UL — SIGNIFICANT CHANGE UP (ref 0–0.9)
MONOCYTES NFR BLD AUTO: 7.5 % — SIGNIFICANT CHANGE UP (ref 2–14)
NEUTROPHILS # BLD AUTO: 5.29 K/UL — SIGNIFICANT CHANGE UP (ref 1.8–7.4)
NEUTROPHILS NFR BLD AUTO: 73 % — SIGNIFICANT CHANGE UP (ref 43–77)
NRBC # BLD: 0 /100 WBCS — SIGNIFICANT CHANGE UP (ref 0–0)
PHOSPHATE SERPL-MCNC: 3.1 MG/DL — SIGNIFICANT CHANGE UP (ref 2.5–4.5)
PLATELET # BLD AUTO: 384 K/UL — SIGNIFICANT CHANGE UP (ref 150–400)
POTASSIUM SERPL-MCNC: 3.8 MMOL/L — SIGNIFICANT CHANGE UP (ref 3.5–5.3)
POTASSIUM SERPL-SCNC: 3.8 MMOL/L — SIGNIFICANT CHANGE UP (ref 3.5–5.3)
RBC # BLD: 4.26 M/UL — SIGNIFICANT CHANGE UP (ref 4.2–5.8)
RBC # FLD: 12.3 % — SIGNIFICANT CHANGE UP (ref 10.3–14.5)
SODIUM SERPL-SCNC: 140 MMOL/L — SIGNIFICANT CHANGE UP (ref 135–145)
WBC # BLD: 7.24 K/UL — SIGNIFICANT CHANGE UP (ref 3.8–10.5)
WBC # FLD AUTO: 7.24 K/UL — SIGNIFICANT CHANGE UP (ref 3.8–10.5)

## 2020-03-29 RX ORDER — ZINC SULFATE TAB 220 MG (50 MG ZINC EQUIVALENT) 220 (50 ZN) MG
220 TAB ORAL DAILY
Refills: 0 | Status: COMPLETED | OUTPATIENT
Start: 2020-03-29 | End: 2020-03-31

## 2020-03-29 RX ADMIN — Medication 200 MILLIGRAM(S): at 06:14

## 2020-03-29 RX ADMIN — ZINC SULFATE TAB 220 MG (50 MG ZINC EQUIVALENT) 220 MILLIGRAM(S): 220 (50 ZN) TAB at 11:09

## 2020-03-29 RX ADMIN — Medication 200 MILLIGRAM(S): at 13:02

## 2020-03-29 RX ADMIN — Medication 1000 UNIT(S): at 11:09

## 2020-03-29 RX ADMIN — Medication 200 MILLIGRAM(S): at 21:36

## 2020-03-29 RX ADMIN — Medication 500 MILLIGRAM(S): at 06:14

## 2020-03-29 RX ADMIN — ALBUTEROL 2 PUFF(S): 90 AEROSOL, METERED ORAL at 21:37

## 2020-03-29 RX ADMIN — Medication 200 MILLIGRAM(S): at 17:12

## 2020-03-29 RX ADMIN — Medication 500 MILLIGRAM(S): at 17:12

## 2020-03-29 RX ADMIN — ENOXAPARIN SODIUM 40 MILLIGRAM(S): 100 INJECTION SUBCUTANEOUS at 11:09

## 2020-03-29 NOTE — ACUTE INTERFACILITY TRANSFER NOTE - HOSPITAL COURSE
Patient is a 59 year old male, with PMH of HTN, who presented to the ED for 1 week of fever up to 101-102, diarrhea, and loss of appetite for 5 days. He noticed slight blood in his urine twice the day prior to admission.     Admitted for rule out COVID.     Flu and RSV negative. Chest x-ray showed multifocal pneumonia. Started on ceftriaxone and azithromycin antibiotics. COVID tested positive. Noted to have elevated creatinine of 2.5 on admission. UA noted to have blood but no RBCs. Creatine kinase noted to be elevated at 602. Nephrology, Dr. Fowler consulted. Patient treated with IV fluids. CT abdomen/pelvis done showed mural wall thickening of bladder wall, possibly due to underdistention. Creatinine returned to normal limits. COVID resulted positive on 3/22/2020. Patient noted to slowly desaturate on room air. Patient started on hydroxychloroquine and placed on nasal cannula. Repeat CXR showed worsening infiltrates. Patient on day 3/4 of plaquenil 200mg BID. Patient is currently saturating at 92% on 2L nasal cannula.

## 2020-03-29 NOTE — PROGRESS NOTE ADULT - SUBJECTIVE AND OBJECTIVE BOX
Patient is a 59y old  Male who presents with a chief complaint of hematuria and fever (28 Mar 2020 13:02)    pt seen in tele [ x ], reg med floor [   ], bed [ x ], chair at bedside [   ], a+o x3 [x  ], lethargic [  ],    nad [ x ]     pt states feeling better    Allergies    No Known Allergies        Vitals    T(F): 98.6 (03-29-20 @ 05:00), Max: 99.4 (03-28-20 @ 20:45)  HR: 62 (03-29-20 @ 05:00) (52 - 68)  BP: 136/77 (03-29-20 @ 05:00) (129/74 - 146/77)  RR: 18 (03-29-20 @ 05:00) (18 - 20)  SpO2: 92% (03-29-20 @ 05:00) (90% - 94%)  Wt(kg): --  CAPILLARY BLOOD GLUCOSE          Labs                          13.5   8.60  )-----------( 357      ( 28 Mar 2020 07:11 )             40.4       03-28    141  |  108  |  12  ----------------------------<  87  4.4   |  26  |  0.95    Ca    8.5      28 Mar 2020 07:11  Phos  2.4     03-28  Mg     2.6     03-28              .Urine  03-22 @ 21:59   No growth  --  --          Radiology Results      Meds    MEDICATIONS  (STANDING):  ascorbic acid 500 milliGRAM(s) Oral two times a day  cholecalciferol 1000 Unit(s) Oral daily  dextrose 5% + sodium chloride 0.9%. 1000 milliLiter(s) (70 mL/Hr) IV Continuous <Continuous>  enoxaparin Injectable 40 milliGRAM(s) SubCutaneous daily  hydroxychloroquine 200 milliGRAM(s) Oral every 12 hours  lactated ringers 1000 milliLiter(s) (100 mL/Hr) IV Continuous <Continuous>      MEDICATIONS  (PRN):  acetaminophen   Tablet .. 650 milliGRAM(s) Oral every 6 hours PRN Temp greater or equal to 38C (100.4F), Moderate Pain (4 - 6)  ALBUTerol    90 MICROgram(s) HFA Inhaler 2 Puff(s) Inhalation every 6 hours PRN Bronchospasm  guaiFENesin   Syrup  (Sugar-Free) 200 milliGRAM(s) Oral every 6 hours PRN Cough  ondansetron Injectable 4 milliGRAM(s) IV Push every 4 hours PRN Nausea and/or Vomiting      Physical Exam    Neuro :  no focal deficits  Respiratory: CTA B/L  CV: RRR, S1S2, no murmurs,   Abdominal: Soft, NT, ND +BS,  Extremities: No edema, + peripheral pulses    ASSESSMENT    multifocal Pneumonia due to organism   r/o covid 19   mild rhabdomyolysis  s/p juno  s/p hyponatremia  h/o HTN (hypertension)  H/O colectomy        PLAN      complete 5 day zithromax  rsv neg noted above  covid -19 positive   afebrile  tmx 98.9  O2 sat on 2l n/c 92% ( range )  cont plaquenil 200mg bid x day2/4  contact and airborne isolation   rept cxr with increasing infilt noted above  cont albuterol inhaler   procalcitonin neg noted above  cont supportive care with tylenol prn, robitussin prn and O2 via nasal canula if needed   blood and ucx neg  pulm f/u   ct abd with Mural thickening of the urinary bladder, possibly due to underdistention. Underlying cystitis is not excluded noted above.   doubt hematuria at present as ua neg for rbc's or infectious process  elevated ck improving noted above  d/c ivf  serum creat wnl noted above  renal f/u   cont current meds Patient is a 59y old  Male who presents with a chief complaint of hematuria and fever (28 Mar 2020 13:02)    pt seen in tele [ x ], reg med floor [   ], bed [ x ], chair at bedside [   ], a+o x3 [x  ], lethargic [  ],    nad [ x ]     pt states feeling better    Allergies    No Known Allergies        Vitals    T(F): 98.6 (03-29-20 @ 05:00), Max: 99.4 (03-28-20 @ 20:45)  HR: 62 (03-29-20 @ 05:00) (52 - 68)  BP: 136/77 (03-29-20 @ 05:00) (129/74 - 146/77)  RR: 18 (03-29-20 @ 05:00) (18 - 20)  SpO2: 92% (03-29-20 @ 05:00) (90% - 94%)  Wt(kg): --  CAPILLARY BLOOD GLUCOSE          Labs                          13.5   8.60  )-----------( 357      ( 28 Mar 2020 07:11 )             40.4       03-28    141  |  108  |  12  ----------------------------<  87  4.4   |  26  |  0.95    Ca    8.5      28 Mar 2020 07:11  Phos  2.4     03-28  Mg     2.6     03-28              .Urine  03-22 @ 21:59   No growth  --  --          Radiology Results      Meds    MEDICATIONS  (STANDING):  ascorbic acid 500 milliGRAM(s) Oral two times a day  cholecalciferol 1000 Unit(s) Oral daily  dextrose 5% + sodium chloride 0.9%. 1000 milliLiter(s) (70 mL/Hr) IV Continuous <Continuous>  enoxaparin Injectable 40 milliGRAM(s) SubCutaneous daily  hydroxychloroquine 200 milliGRAM(s) Oral every 12 hours  lactated ringers 1000 milliLiter(s) (100 mL/Hr) IV Continuous <Continuous>      MEDICATIONS  (PRN):  acetaminophen   Tablet .. 650 milliGRAM(s) Oral every 6 hours PRN Temp greater or equal to 38C (100.4F), Moderate Pain (4 - 6)  ALBUTerol    90 MICROgram(s) HFA Inhaler 2 Puff(s) Inhalation every 6 hours PRN Bronchospasm  guaiFENesin   Syrup  (Sugar-Free) 200 milliGRAM(s) Oral every 6 hours PRN Cough  ondansetron Injectable 4 milliGRAM(s) IV Push every 4 hours PRN Nausea and/or Vomiting      Physical Exam    Neuro :  no focal deficits  Respiratory: CTA B/L  CV: RRR, S1S2, no murmurs,   Abdominal: Soft, NT, ND +BS,  Extremities: No edema, + peripheral pulses    ASSESSMENT    multifocal Pneumonia due to organism   r/o covid 19   mild rhabdomyolysis  s/p juno  s/p hyponatremia  h/o HTN (hypertension)  H/O colectomy        PLAN      complete 5 day zithromax  rsv neg noted above  covid -19 positive   afebrile  tmx 99.4  O2 sat on 2l n/c 92% ( range 90-94%)  cont plaquenil 200mg bid x day3/4  add zinc 220 daily  contact and airborne isolation   rept cxr with increasing infilt noted above  cont albuterol inhaler   procalcitonin neg noted above  cont supportive care with tylenol prn, robitussin prn and O2 via nasal canula if needed   blood and ucx neg  pulm f/u   ct abd with Mural thickening of the urinary bladder, possibly due to underdistention. Underlying cystitis is not excluded noted above.   doubt hematuria at present as ua neg for rbc's or infectious process  elevated ck improving noted above  d/c ivf  serum creat wnl noted above  renal f/u   cont current meds

## 2020-03-29 NOTE — ACUTE INTERFACILITY TRANSFER NOTE - PLAN OF CARE
Continuity of care Continue with medications as prescribed  Maintain adequate hydration, nutrition, rest & activity as tolerated.  Cover your cough when and sneeze  Wash your hands for at least 20 seconds  Patient is currently saturating at 92% on 2L nasal cannula.

## 2020-03-29 NOTE — PROGRESS NOTE ADULT - PROBLEM SELECTOR PLAN 1
COVID-19+  Continue Antibiotics  Oxygen Supp   Monitor SpO2.  Monitor temp and WBC.  IVF  LDH, Ferritin and CRP  Zofran IV for nausea   Albuterol inhaler PRN.   Tylenol and Robitussin PRN   Isolation precautions. COVID-19+  Continue Antibiotics  Oxygen Supp   Monitor SpO2.  Monitor temp and WBC.  IVF  LDH, Ferritin and CRP  Zofran IV for nausea   Albuterol inhaler PRN.   Tylenol and Robitussin PRN   Isolation precautions.  Vit C. D, B12 and Zinc as needed

## 2020-03-29 NOTE — PROGRESS NOTE ADULT - SUBJECTIVE AND OBJECTIVE BOX
Patient is a 59y old  Male who presents with a chief complaint of hematuria and fever (29 Mar 2020 10:34)      Pt is awake, alert, lying in bed in NAD. Still on isolation precautions. No diarrhea but sob even at rest associated with body aches      INTERVAL HPI/OVERNIGHT EVENTS:      VITAL SIGNS:  T(F): 98.6 (03-29-20 @ 07:32)  HR: 60 (03-29-20 @ 07:32)  BP: 140/78 (03-29-20 @ 07:32)  RR: 17 (03-29-20 @ 07:32)  SpO2: 92% (03-29-20 @ 07:32)  Wt(kg): --  I&O's Detail          REVIEW OF SYSTEMS:    CONSTITUTIONAL:  No fevers, chills, sweats    HEENT:  Eyes:  No diplopia or blurred vision. ENT:  No earache, sore throat or runny nose.    CARDIOVASCULAR:  No pressure, squeezing, tightness, or heaviness about the chest; no palpitations.    RESPIRATORY:  Per HPI    GASTROINTESTINAL:  No abdominal pain, nausea, vomiting or diarrhea.    GENITOURINARY:  No dysuria, frequency or urgency.    NEUROLOGIC:  No paresthesias, fasciculations, seizures or weakness.    PSYCHIATRIC:  No disorder of thought or mood.      PHYSICAL EXAM:    Constitutional: Well developed and nourished  Eyes:Perrla  ENMT: normal  Neck:supple  Respiratory: good air entry  Cardiovascular: S1 S2 regular  Gastrointestinal: Soft, Non tender  Extremities: No edema  Vascular:normal  Neurological:Awake, alert,Ox3  Musculoskeletal:Normal      MEDICATIONS  (STANDING):  ascorbic acid 500 milliGRAM(s) Oral two times a day  cholecalciferol 1000 Unit(s) Oral daily  dextrose 5% + sodium chloride 0.9%. 1000 milliLiter(s) (70 mL/Hr) IV Continuous <Continuous>  enoxaparin Injectable 40 milliGRAM(s) SubCutaneous daily  hydroxychloroquine 200 milliGRAM(s) Oral every 12 hours  lactated ringers 1000 milliLiter(s) (100 mL/Hr) IV Continuous <Continuous>  zinc sulfate 220 milliGRAM(s) Oral daily    MEDICATIONS  (PRN):  acetaminophen   Tablet .. 650 milliGRAM(s) Oral every 6 hours PRN Temp greater or equal to 38C (100.4F), Moderate Pain (4 - 6)  ALBUTerol    90 MICROgram(s) HFA Inhaler 2 Puff(s) Inhalation every 6 hours PRN Bronchospasm  guaiFENesin   Syrup  (Sugar-Free) 200 milliGRAM(s) Oral every 6 hours PRN Cough  ondansetron Injectable 4 milliGRAM(s) IV Push every 4 hours PRN Nausea and/or Vomiting      Allergies    No Known Allergies    Intolerances        LABS:                        13.2   7.24  )-----------( 384      ( 29 Mar 2020 07:14 )             38.6     03-29    140  |  107  |  16  ----------------------------<  106<H>  3.8   |  29  |  0.87    Ca    8.8      29 Mar 2020 07:14  Phos  3.1     03-29  Mg     2.4     03-29                CAPILLARY BLOOD GLUCOSE            RADIOLOGY & ADDITIONAL TESTS:      CXR:  < from: Xray Chest 1 View- PORTABLE-Routine (03.26.20 @ 13:25) >  IMPRESSION: Increasing infiltrates.    < end of copied text >    Ct scan chest:    ekg;    echo: Patient is a 59y old  Male who presents with a chief complaint of hematuria and fever (29 Mar 2020 10:34)      Pt is Awake, alert, lying in bed in NAD. Still on isolation precautions. No diarrhea but sob even at rest associated with body aches. Afebrile      INTERVAL HPI/OVERNIGHT EVENTS:      VITAL SIGNS:  T(F): 98.6 (03-29-20 @ 07:32)  HR: 60 (03-29-20 @ 07:32)  BP: 140/78 (03-29-20 @ 07:32)  RR: 17 (03-29-20 @ 07:32)  SpO2: 92% (03-29-20 @ 07:32)  Wt(kg): --  I&O's Detail          REVIEW OF SYSTEMS:    CONSTITUTIONAL:  No fevers, chills, sweats    HEENT:  Eyes:  No diplopia or blurred vision. ENT:  No earache, sore throat or runny nose.    CARDIOVASCULAR:  No pressure, squeezing, tightness, or heaviness about the chest; no palpitations.    RESPIRATORY:  Per HPI    GASTROINTESTINAL:  No abdominal pain, nausea, vomiting or diarrhea.    GENITOURINARY:  No dysuria, frequency or urgency.    NEUROLOGIC:  No paresthesias, fasciculations, seizures or weakness.    PSYCHIATRIC:  No disorder of thought or mood.      PHYSICAL EXAM:    Constitutional: Well developed and nourished  Eyes:Perrla  ENMT: normal  Neck:supple  Respiratory: good air entry  Cardiovascular: S1 S2 regular  Gastrointestinal: Soft, Non tender  Extremities: No edema  Vascular:normal  Neurological:Awake, alert,Ox3  Musculoskeletal:Normal      MEDICATIONS  (STANDING):  ascorbic acid 500 milliGRAM(s) Oral two times a day  cholecalciferol 1000 Unit(s) Oral daily  dextrose 5% + sodium chloride 0.9%. 1000 milliLiter(s) (70 mL/Hr) IV Continuous <Continuous>  enoxaparin Injectable 40 milliGRAM(s) SubCutaneous daily  hydroxychloroquine 200 milliGRAM(s) Oral every 12 hours  lactated ringers 1000 milliLiter(s) (100 mL/Hr) IV Continuous <Continuous>  zinc sulfate 220 milliGRAM(s) Oral daily    MEDICATIONS  (PRN):  acetaminophen   Tablet .. 650 milliGRAM(s) Oral every 6 hours PRN Temp greater or equal to 38C (100.4F), Moderate Pain (4 - 6)  ALBUTerol    90 MICROgram(s) HFA Inhaler 2 Puff(s) Inhalation every 6 hours PRN Bronchospasm  guaiFENesin   Syrup  (Sugar-Free) 200 milliGRAM(s) Oral every 6 hours PRN Cough  ondansetron Injectable 4 milliGRAM(s) IV Push every 4 hours PRN Nausea and/or Vomiting      Allergies    No Known Allergies    Intolerances        LABS:                        13.2   7.24  )-----------( 384      ( 29 Mar 2020 07:14 )             38.6     03-29    140  |  107  |  16  ----------------------------<  106<H>  3.8   |  29  |  0.87    Ca    8.8      29 Mar 2020 07:14  Phos  3.1     03-29  Mg     2.4     03-29                CAPILLARY BLOOD GLUCOSE            RADIOLOGY & ADDITIONAL TESTS:      CXR:  < from: Xray Chest 1 View- PORTABLE-Routine (03.26.20 @ 13:25) >  IMPRESSION: Increasing infiltrates.    < end of copied text >    Ct scan chest:    ekg;    echo:

## 2020-03-30 LAB
ANION GAP SERPL CALC-SCNC: 7 MMOL/L — SIGNIFICANT CHANGE UP (ref 5–17)
BASOPHILS # BLD AUTO: 0.02 K/UL — SIGNIFICANT CHANGE UP (ref 0–0.2)
BASOPHILS NFR BLD AUTO: 0.3 % — SIGNIFICANT CHANGE UP (ref 0–2)
BUN SERPL-MCNC: 18 MG/DL — SIGNIFICANT CHANGE UP (ref 7–18)
CALCIUM SERPL-MCNC: 9.3 MG/DL — SIGNIFICANT CHANGE UP (ref 8.4–10.5)
CHLORIDE SERPL-SCNC: 106 MMOL/L — SIGNIFICANT CHANGE UP (ref 96–108)
CO2 SERPL-SCNC: 28 MMOL/L — SIGNIFICANT CHANGE UP (ref 22–31)
CREAT SERPL-MCNC: 0.92 MG/DL — SIGNIFICANT CHANGE UP (ref 0.5–1.3)
EOSINOPHIL # BLD AUTO: 0.19 K/UL — SIGNIFICANT CHANGE UP (ref 0–0.5)
EOSINOPHIL NFR BLD AUTO: 3 % — SIGNIFICANT CHANGE UP (ref 0–6)
GLUCOSE SERPL-MCNC: 97 MG/DL — SIGNIFICANT CHANGE UP (ref 70–99)
HCT VFR BLD CALC: 38.8 % — LOW (ref 39–50)
HGB BLD-MCNC: 13 G/DL — SIGNIFICANT CHANGE UP (ref 13–17)
IMM GRANULOCYTES NFR BLD AUTO: 0.6 % — SIGNIFICANT CHANGE UP (ref 0–1.5)
LYMPHOCYTES # BLD AUTO: 1.12 K/UL — SIGNIFICANT CHANGE UP (ref 1–3.3)
LYMPHOCYTES # BLD AUTO: 17.5 % — SIGNIFICANT CHANGE UP (ref 13–44)
MAGNESIUM SERPL-MCNC: 2.5 MG/DL — SIGNIFICANT CHANGE UP (ref 1.6–2.6)
MCHC RBC-ENTMCNC: 30.8 PG — SIGNIFICANT CHANGE UP (ref 27–34)
MCHC RBC-ENTMCNC: 33.5 GM/DL — SIGNIFICANT CHANGE UP (ref 32–36)
MCV RBC AUTO: 91.9 FL — SIGNIFICANT CHANGE UP (ref 80–100)
MONOCYTES # BLD AUTO: 0.6 K/UL — SIGNIFICANT CHANGE UP (ref 0–0.9)
MONOCYTES NFR BLD AUTO: 9.4 % — SIGNIFICANT CHANGE UP (ref 2–14)
NEUTROPHILS # BLD AUTO: 4.42 K/UL — SIGNIFICANT CHANGE UP (ref 1.8–7.4)
NEUTROPHILS NFR BLD AUTO: 69.2 % — SIGNIFICANT CHANGE UP (ref 43–77)
NRBC # BLD: 0 /100 WBCS — SIGNIFICANT CHANGE UP (ref 0–0)
PHOSPHATE SERPL-MCNC: 4.2 MG/DL — SIGNIFICANT CHANGE UP (ref 2.5–4.5)
PLATELET # BLD AUTO: 311 K/UL — SIGNIFICANT CHANGE UP (ref 150–400)
POTASSIUM SERPL-MCNC: 4.9 MMOL/L — SIGNIFICANT CHANGE UP (ref 3.5–5.3)
POTASSIUM SERPL-SCNC: 4.9 MMOL/L — SIGNIFICANT CHANGE UP (ref 3.5–5.3)
RBC # BLD: 4.22 M/UL — SIGNIFICANT CHANGE UP (ref 4.2–5.8)
RBC # FLD: 12.6 % — SIGNIFICANT CHANGE UP (ref 10.3–14.5)
SODIUM SERPL-SCNC: 141 MMOL/L — SIGNIFICANT CHANGE UP (ref 135–145)
WBC # BLD: 6.39 K/UL — SIGNIFICANT CHANGE UP (ref 3.8–10.5)
WBC # FLD AUTO: 6.39 K/UL — SIGNIFICANT CHANGE UP (ref 3.8–10.5)

## 2020-03-30 RX ORDER — THIAMINE MONONITRATE (VIT B1) 100 MG
100 TABLET ORAL DAILY
Refills: 0 | Status: DISCONTINUED | OUTPATIENT
Start: 2020-03-30 | End: 2020-03-31

## 2020-03-30 RX ADMIN — ENOXAPARIN SODIUM 40 MILLIGRAM(S): 100 INJECTION SUBCUTANEOUS at 11:43

## 2020-03-30 RX ADMIN — Medication 1000 UNIT(S): at 11:43

## 2020-03-30 RX ADMIN — Medication 200 MILLIGRAM(S): at 17:31

## 2020-03-30 RX ADMIN — Medication 500 MILLIGRAM(S): at 17:31

## 2020-03-30 RX ADMIN — Medication 200 MILLIGRAM(S): at 05:06

## 2020-03-30 RX ADMIN — ZINC SULFATE TAB 220 MG (50 MG ZINC EQUIVALENT) 220 MILLIGRAM(S): 220 (50 ZN) TAB at 11:43

## 2020-03-30 RX ADMIN — Medication 500 MILLIGRAM(S): at 05:06

## 2020-03-30 NOTE — PROGRESS NOTE ADULT - SUBJECTIVE AND OBJECTIVE BOX
Patient is a 59y old  Male who presents with a chief complaint of hematuria and fever (29 Mar 2020 12:18)    pt seen in tele [ x ], reg med floor [   ], bed [ x ], chair at bedside [   ], a+o x3 [x  ], lethargic [  ],    nad [ x ]     pt states feeling better]        Allergies    No Known Allergies        Vitals    T(F): 98.2 (03-30-20 @ 05:00), Max: 99 (03-29-20 @ 21:32)  HR: 56 (03-30-20 @ 05:00) (56 - 65)  BP: 134/80 (03-30-20 @ 05:00) (122/78 - 141/74)  RR: 18 (03-30-20 @ 05:00) (17 - 18)  SpO2: 95% (03-30-20 @ 05:00) (92% - 95%)  Wt(kg): --  CAPILLARY BLOOD GLUCOSE          Labs                          13.2   7.24  )-----------( 384      ( 29 Mar 2020 07:14 )             38.6       03-29    140  |  107  |  16  ----------------------------<  106<H>  3.8   |  29  |  0.87    Ca    8.8      29 Mar 2020 07:14  Phos  3.1     03-29  Mg     2.4     03-29              .Urine  03-22 @ 21:59   No growth  --  --          Radiology Results      Meds    MEDICATIONS  (STANDING):  ascorbic acid 500 milliGRAM(s) Oral two times a day  cholecalciferol 1000 Unit(s) Oral daily  dextrose 5% + sodium chloride 0.9%. 1000 milliLiter(s) (70 mL/Hr) IV Continuous <Continuous>  enoxaparin Injectable 40 milliGRAM(s) SubCutaneous daily  hydroxychloroquine 200 milliGRAM(s) Oral every 12 hours  lactated ringers 1000 milliLiter(s) (100 mL/Hr) IV Continuous <Continuous>  zinc sulfate 220 milliGRAM(s) Oral daily      MEDICATIONS  (PRN):  acetaminophen   Tablet .. 650 milliGRAM(s) Oral every 6 hours PRN Temp greater or equal to 38C (100.4F), Moderate Pain (4 - 6)  ALBUTerol    90 MICROgram(s) HFA Inhaler 2 Puff(s) Inhalation every 6 hours PRN Bronchospasm  guaiFENesin   Syrup  (Sugar-Free) 200 milliGRAM(s) Oral every 6 hours PRN Cough  ondansetron Injectable 4 milliGRAM(s) IV Push every 4 hours PRN Nausea and/or Vomiting      Physical Exam    Neuro :  no focal deficits  Respiratory: CTA B/L  CV: RRR, S1S2, no murmurs,   Abdominal: Soft, NT, ND +BS,  Extremities: No edema, + peripheral pulses    ASSESSMENT    multifocal Pneumonia due to organism   r/o covid 19   mild rhabdomyolysis  s/p juno  s/p hyponatremia  h/o HTN (hypertension)  H/O colectomy        PLAN      complete 5 day zithromax  rsv neg noted above  covid -19 positive   afebrile  tmx 99.4  O2 sat on 2l n/c 92% ( range 90-94%)  cont plaquenil 200mg bid x day3/4  add zinc 220 daily  contact and airborne isolation   rept cxr with increasing infilt noted above  cont albuterol inhaler   procalcitonin neg noted above  cont supportive care with tylenol prn, robitussin prn and O2 via nasal canula if needed   blood and ucx neg  pulm f/u   ct abd with Mural thickening of the urinary bladder, possibly due to underdistention. Underlying cystitis is not excluded noted above.   doubt hematuria at present as ua neg for rbc's or infectious process  elevated ck improving noted above  d/c ivf  serum creat wnl noted above  renal f/u   cont current meds Patient is a 59y old  Male who presents with a chief complaint of hematuria and fever (29 Mar 2020 12:18)    pt seen in tele [ x ], reg med floor [   ], bed [ x ], chair at bedside [   ], a+o x3 [x  ], lethargic [  ],    nad [ x ]     pt states feeling better]        Allergies    No Known Allergies        Vitals    T(F): 98.2 (03-30-20 @ 05:00), Max: 99 (03-29-20 @ 21:32)  HR: 56 (03-30-20 @ 05:00) (56 - 65)  BP: 134/80 (03-30-20 @ 05:00) (122/78 - 141/74)  RR: 18 (03-30-20 @ 05:00) (17 - 18)  SpO2: 95% (03-30-20 @ 05:00) (92% - 95%)  Wt(kg): --  CAPILLARY BLOOD GLUCOSE          Labs                          13.2   7.24  )-----------( 384      ( 29 Mar 2020 07:14 )             38.6       03-29    140  |  107  |  16  ----------------------------<  106<H>  3.8   |  29  |  0.87    Ca    8.8      29 Mar 2020 07:14  Phos  3.1     03-29  Mg     2.4     03-29              .Urine  03-22 @ 21:59   No growth  --  --          Radiology Results      Meds    MEDICATIONS  (STANDING):  ascorbic acid 500 milliGRAM(s) Oral two times a day  cholecalciferol 1000 Unit(s) Oral daily  dextrose 5% + sodium chloride 0.9%. 1000 milliLiter(s) (70 mL/Hr) IV Continuous <Continuous>  enoxaparin Injectable 40 milliGRAM(s) SubCutaneous daily  hydroxychloroquine 200 milliGRAM(s) Oral every 12 hours  lactated ringers 1000 milliLiter(s) (100 mL/Hr) IV Continuous <Continuous>  zinc sulfate 220 milliGRAM(s) Oral daily      MEDICATIONS  (PRN):  acetaminophen   Tablet .. 650 milliGRAM(s) Oral every 6 hours PRN Temp greater or equal to 38C (100.4F), Moderate Pain (4 - 6)  ALBUTerol    90 MICROgram(s) HFA Inhaler 2 Puff(s) Inhalation every 6 hours PRN Bronchospasm  guaiFENesin   Syrup  (Sugar-Free) 200 milliGRAM(s) Oral every 6 hours PRN Cough  ondansetron Injectable 4 milliGRAM(s) IV Push every 4 hours PRN Nausea and/or Vomiting      Physical Exam    Neuro :  no focal deficits  Respiratory: CTA B/L  CV: RRR, S1S2, no murmurs,   Abdominal: Soft, NT, ND +BS,  Extremities: No edema, + peripheral pulses    ASSESSMENT    multifocal Pneumonia due to organism   r/o covid 19   mild rhabdomyolysis  s/p juno  s/p hyponatremia  h/o HTN (hypertension)  H/O colectomy        PLAN      complete 5 day zithromax  rsv neg noted above  covid -19 positive   afebrile  tmx 99  O2 sat on 2l n/c 95% ( range 92-95%)   taper O2 to ra  cont plaquenil 200mg bid x day 4/4  cont zinc 220 daily  contact and airborne isolation   rept cxr with increasing infilt noted above  cont albuterol inhaler   procalcitonin neg noted above  cont supportive care with tylenol prn, robitussin prn and O2 via nasal canula if needed   blood and ucx neg  pulm f/u   ct abd with Mural thickening of the urinary bladder, possibly due to underdistention. Underlying cystitis is not excluded noted above.   doubt hematuria at present as ua neg for rbc's or infectious process  elevated ck improving noted above  d/c ivf  serum creat wnl noted above  renal f/u   cont current meds   d/c plan for am if O2 sat on ra >94%

## 2020-03-30 NOTE — PROGRESS NOTE ADULT - SUBJECTIVE AND OBJECTIVE BOX
PGY 1 Note discussed with supervising resident and primary attending    Patient is a 59y old  Male who presents with a chief complaint of hematuria and fever (30 Mar 2020 06:34)    INTERVAL HPI/OVERNIGHT EVENTS: Patient seen and examined at the bedside. Patient has been afebrile. Currently saturating at 94% on 2L nasal cannula. On hydroxychloroquine day 5/5. Patient states he feels better this morning. Has slight cough. States having some shortness of breath when he is off oxygen.     MEDICATIONS  (STANDING):  ascorbic acid 500 milliGRAM(s) Oral two times a day  cholecalciferol 1000 Unit(s) Oral daily  dextrose 5% + sodium chloride 0.9%. 1000 milliLiter(s) (70 mL/Hr) IV Continuous <Continuous>  enoxaparin Injectable 40 milliGRAM(s) SubCutaneous daily  hydroxychloroquine 200 milliGRAM(s) Oral every 12 hours  lactated ringers 1000 milliLiter(s) (100 mL/Hr) IV Continuous <Continuous>  zinc sulfate 220 milliGRAM(s) Oral daily    MEDICATIONS  (PRN):  acetaminophen   Tablet .. 650 milliGRAM(s) Oral every 6 hours PRN Temp greater or equal to 38C (100.4F), Moderate Pain (4 - 6)  ALBUTerol    90 MICROgram(s) HFA Inhaler 2 Puff(s) Inhalation every 6 hours PRN Bronchospasm  guaiFENesin   Syrup  (Sugar-Free) 200 milliGRAM(s) Oral every 6 hours PRN Cough  ondansetron Injectable 4 milliGRAM(s) IV Push every 4 hours PRN Nausea and/or Vomiting      __________________________________________________  REVIEW OF SYSTEMS:  CONSTITUTIONAL: No fever  EYES: no visual disturbances  NECK: No pain   RESPIRATORY: No cough; Mild shortness of breath  CARDIOVASCULAR: No chest pain  GASTROINTESTINAL: No pain. No nausea or vomiting; No diarrhea   NEUROLOGICAL: No headache   MUSCULOSKELETAL: No joint pain, no muscle pain  GENITOURINARY: no dysuria  PSYCHIATRY: no anxiety  ALL OTHER  ROS negative        Vital Signs Last 24 Hrs  T(C): 36.4 (30 Mar 2020 07:42), Max: 37.2 (29 Mar 2020 21:32)  T(F): 97.6 (30 Mar 2020 07:42), Max: 99 (29 Mar 2020 21:32)  HR: 51 (30 Mar 2020 07:42) (51 - 65)  BP: 136/85 (30 Mar 2020 07:42) (122/78 - 141/74)  RR: 26 (30 Mar 2020 07:42) (17 - 26)  SpO2: 94% (30 Mar 2020 07:42) (92% - 95%)    ________________________________________________  PHYSICAL EXAM:  GENERAL: Patient seen resting in bed and in no apparent distress  HEENT: Normocephalic; conjunctivae and sclerae clear   NECK: supple  CHEST/LUNG: Mild crackles present to auscultation bilaterally    HEART: S1 S2, regular; no murmurs  ABDOMEN: Soft, Nontender, Nondistended; Bowel sounds present  EXTREMITIES: no edema; no calf tenderness  SKIN: warm and dry  NERVOUS SYSTEM: Awake and alert; Oriented to place, person and time     _________________________________________________  LABS:                        13.0   6.39  )-----------( 311      ( 30 Mar 2020 08:18 )             38.8     03-30    141  |  106  |  18  ----------------------------<  97  4.9   |  28  |  0.92    Ca    9.3      30 Mar 2020 08:18  Phos  4.2     03-30  Mg     2.5     03-30      RADIOLOGY & ADDITIONAL TESTS:    Imaging Personally Reviewed:  YES    No new imaging     Consultant(s) Notes Reviewed:   YES    Care Discussed with Consultants :     Plan of care was discussed with patient and /or primary care giver; all questions and concerns were addressed and care was aligned with patient's wishes.

## 2020-03-30 NOTE — PROGRESS NOTE ADULT - PROBLEM SELECTOR PLAN 1
p/w fever, diarrhea   - Flu: neg   - CXR: multifocal PNA   Ed course; Rocephin and Azithro   - DC Rocephin; completed course of Azithro of 5 days  - Covid 19 positive: contact and airborne isolation precaution   - Blood culture negative   - Tylenol, Albuterol Inhaler, Robitussin PRN  -monitor O2 sat; currently at 94% on 2L nasal cannula  -repeat CXR showing increased infiltrates   -RVP negative  -to complete hydroxychloroquine today

## 2020-03-30 NOTE — PROGRESS NOTE ADULT - PROBLEM SELECTOR PLAN 1
COVID-19+  Continue Antibiotics  Oxygen Supp   Monitor SpO2.  Monitor temp and WBC.  IVF  LDH, Ferritin and CRP  Zofran IV for nausea   Albuterol inhaler PRN.   Tylenol and Robitussin PRN   Isolation precautions.  Vit C. D, B12 and Zinc as needed.

## 2020-03-30 NOTE — PROGRESS NOTE ADULT - SUBJECTIVE AND OBJECTIVE BOX
Patient is a 59y old  Male who presents with a chief complaint of hematuria and fever (30 Mar 2020 09:44)      Pt is Awake, alert, lying in bed in NAD. Still on isolation precautions. No cough or sob at this moment. Afebrile.    INTERVAL HPI/OVERNIGHT EVENTS:      VITAL SIGNS:  T(F): 97.6 (03-30-20 @ 07:42)  HR: 51 (03-30-20 @ 07:42)  BP: 136/85 (03-30-20 @ 07:42)  RR: 26 (03-30-20 @ 07:42)  SpO2: 94% (03-30-20 @ 07:42)  Wt(kg): --  I&O's Detail          REVIEW OF SYSTEMS:    CONSTITUTIONAL:  No fevers, chills, sweats    HEENT:  Eyes:  No diplopia or blurred vision. ENT:  No earache, sore throat or runny nose.    CARDIOVASCULAR:  No pressure, squeezing, tightness, or heaviness about the chest; no palpitations.    RESPIRATORY:  Per HPI    GASTROINTESTINAL:  No abdominal pain, nausea, vomiting or diarrhea.    GENITOURINARY:  No dysuria, frequency or urgency.    NEUROLOGIC:  No paresthesias, fasciculations, seizures or weakness.    PSYCHIATRIC:  No disorder of thought or mood.      PHYSICAL EXAM:    Constitutional: Well developed and nourished  Eyes:Perrla  ENMT: normal  Neck:supple  Respiratory: good air entry  Cardiovascular: S1 S2 regular  Gastrointestinal: Soft, Non tender  Extremities: No edema  Vascular:normal  Neurological:Awake, alert,Ox3  Musculoskeletal:Normal      MEDICATIONS  (STANDING):  ascorbic acid 500 milliGRAM(s) Oral two times a day  cholecalciferol 1000 Unit(s) Oral daily  dextrose 5% + sodium chloride 0.9%. 1000 milliLiter(s) (70 mL/Hr) IV Continuous <Continuous>  enoxaparin Injectable 40 milliGRAM(s) SubCutaneous daily  hydroxychloroquine 200 milliGRAM(s) Oral every 12 hours  lactated ringers 1000 milliLiter(s) (100 mL/Hr) IV Continuous <Continuous>  zinc sulfate 220 milliGRAM(s) Oral daily    MEDICATIONS  (PRN):  acetaminophen   Tablet .. 650 milliGRAM(s) Oral every 6 hours PRN Temp greater or equal to 38C (100.4F), Moderate Pain (4 - 6)  ALBUTerol    90 MICROgram(s) HFA Inhaler 2 Puff(s) Inhalation every 6 hours PRN Bronchospasm  guaiFENesin   Syrup  (Sugar-Free) 200 milliGRAM(s) Oral every 6 hours PRN Cough  ondansetron Injectable 4 milliGRAM(s) IV Push every 4 hours PRN Nausea and/or Vomiting      Allergies    No Known Allergies    Intolerances        LABS:                        13.0   6.39  )-----------( 311      ( 30 Mar 2020 08:18 )             38.8     03-30    141  |  106  |  18  ----------------------------<  97  4.9   |  28  |  0.92    Ca    9.3      30 Mar 2020 08:18  Phos  4.2     03-30  Mg     2.5     03-30                CAPILLARY BLOOD GLUCOSE        COVID-19 PCR . (03.23.20 @ 09:59)    COVID-19 PCR: Detected: All “detected” results on this new test are considered presumptively  positive results, are clinically actionable, and specimens will be  forwarded to [facility] for confirmation testing.  Another report (corrected report) will only be issued if discordant  results occur.  This test has been validated by [employer] to be accurate;  though it has not been FDA cleared/approved by the usual pathway.  As with all laboratory tests, results should be correlated with clinical  findings.        RADIOLOGY & ADDITIONAL TESTS:    CXR:    < from: Xray Chest 1 View- PORTABLE-Routine (03.26.20 @ 13:25) >  IMPRESSION: Increasing infiltrates.    < end of copied text >    Ct scan chest:    ekg;    echo: Pt is Awake, alert, lying in bed in NAD. Still on isolation precautions. No cough or sob, on O2NC. Afebrile. Feels better. SpO2 94%.     INTERVAL HPI/OVERNIGHT EVENTS:      VITAL SIGNS:  T(F): 97.6 (03-30-20 @ 07:42)  HR: 51 (03-30-20 @ 07:42)  BP: 136/85 (03-30-20 @ 07:42)  RR: 26 (03-30-20 @ 07:42)  SpO2: 94% (03-30-20 @ 07:42)  Wt(kg): --  I&O's Detail          REVIEW OF SYSTEMS:    CONSTITUTIONAL:  No fevers, chills, sweats    HEENT:  Eyes:  No diplopia or blurred vision. ENT:  No earache, sore throat or runny nose.    CARDIOVASCULAR:  No pressure, squeezing, tightness, or heaviness about the chest; no palpitations.    RESPIRATORY:  Per HPI    GASTROINTESTINAL:  No abdominal pain, nausea, vomiting or diarrhea.    GENITOURINARY:  No dysuria, frequency or urgency.    NEUROLOGIC:  No paresthesias, fasciculations, seizures or weakness.    PSYCHIATRIC:  No disorder of thought or mood.      PHYSICAL EXAM:    Constitutional: Well developed and nourished  Eyes:Perrla  ENMT: normal  Neck:supple  Respiratory: good air entry  Cardiovascular: S1 S2 regular  Gastrointestinal: Soft, Non tender  Extremities: No edema  Vascular:normal  Neurological:Awake, alert,Ox3  Musculoskeletal:Normal      MEDICATIONS  (STANDING):  ascorbic acid 500 milliGRAM(s) Oral two times a day  cholecalciferol 1000 Unit(s) Oral daily  dextrose 5% + sodium chloride 0.9%. 1000 milliLiter(s) (70 mL/Hr) IV Continuous <Continuous>  enoxaparin Injectable 40 milliGRAM(s) SubCutaneous daily  hydroxychloroquine 200 milliGRAM(s) Oral every 12 hours  lactated ringers 1000 milliLiter(s) (100 mL/Hr) IV Continuous <Continuous>  zinc sulfate 220 milliGRAM(s) Oral daily    MEDICATIONS  (PRN):  acetaminophen   Tablet .. 650 milliGRAM(s) Oral every 6 hours PRN Temp greater or equal to 38C (100.4F), Moderate Pain (4 - 6)  ALBUTerol    90 MICROgram(s) HFA Inhaler 2 Puff(s) Inhalation every 6 hours PRN Bronchospasm  guaiFENesin   Syrup  (Sugar-Free) 200 milliGRAM(s) Oral every 6 hours PRN Cough  ondansetron Injectable 4 milliGRAM(s) IV Push every 4 hours PRN Nausea and/or Vomiting      Allergies    No Known Allergies    Intolerances        LABS:                        13.0   6.39  )-----------( 311      ( 30 Mar 2020 08:18 )             38.8     03-30    141  |  106  |  18  ----------------------------<  97  4.9   |  28  |  0.92    Ca    9.3      30 Mar 2020 08:18  Phos  4.2     03-30  Mg     2.5     03-30                CAPILLARY BLOOD GLUCOSE        COVID-19 PCR . (03.23.20 @ 09:59)    COVID-19 PCR: Detected: All “detected” results on this new test are considered presumptively  positive results, are clinically actionable, and specimens will be  forwarded to [facility] for confirmation testing.  Another report (corrected report) will only be issued if discordant  results occur.  This test has been validated by [email or web address] to be accurate;  though it has not been FDA cleared/approved by the usual pathway.  As with all laboratory tests, results should be correlated with clinical  findings.        RADIOLOGY & ADDITIONAL TESTS:    CXR:    < from: Xray Chest 1 View- PORTABLE-Routine (03.26.20 @ 13:25) >  IMPRESSION: Increasing infiltrates.    < end of copied text >    Ct scan chest:    ekg;    echo:

## 2020-03-31 VITALS
OXYGEN SATURATION: 93 % | RESPIRATION RATE: 18 BRPM | TEMPERATURE: 99 F | DIASTOLIC BLOOD PRESSURE: 74 MMHG | HEART RATE: 60 BPM | SYSTOLIC BLOOD PRESSURE: 137 MMHG

## 2020-03-31 LAB
ANION GAP SERPL CALC-SCNC: 8 MMOL/L — SIGNIFICANT CHANGE UP (ref 5–17)
BASOPHILS # BLD AUTO: 0.05 K/UL — SIGNIFICANT CHANGE UP (ref 0–0.2)
BASOPHILS NFR BLD AUTO: 0.7 % — SIGNIFICANT CHANGE UP (ref 0–2)
BUN SERPL-MCNC: 17 MG/DL — SIGNIFICANT CHANGE UP (ref 7–18)
CALCIUM SERPL-MCNC: 9.3 MG/DL — SIGNIFICANT CHANGE UP (ref 8.4–10.5)
CHLORIDE SERPL-SCNC: 106 MMOL/L — SIGNIFICANT CHANGE UP (ref 96–108)
CO2 SERPL-SCNC: 28 MMOL/L — SIGNIFICANT CHANGE UP (ref 22–31)
CREAT SERPL-MCNC: 0.94 MG/DL — SIGNIFICANT CHANGE UP (ref 0.5–1.3)
EOSINOPHIL # BLD AUTO: 0.2 K/UL — SIGNIFICANT CHANGE UP (ref 0–0.5)
EOSINOPHIL NFR BLD AUTO: 2.6 % — SIGNIFICANT CHANGE UP (ref 0–6)
GLUCOSE SERPL-MCNC: 99 MG/DL — SIGNIFICANT CHANGE UP (ref 70–99)
HCT VFR BLD CALC: 38 % — LOW (ref 39–50)
HGB BLD-MCNC: 12.7 G/DL — LOW (ref 13–17)
IMM GRANULOCYTES NFR BLD AUTO: 0.8 % — SIGNIFICANT CHANGE UP (ref 0–1.5)
LYMPHOCYTES # BLD AUTO: 1.27 K/UL — SIGNIFICANT CHANGE UP (ref 1–3.3)
LYMPHOCYTES # BLD AUTO: 16.8 % — SIGNIFICANT CHANGE UP (ref 13–44)
MAGNESIUM SERPL-MCNC: 2.1 MG/DL — SIGNIFICANT CHANGE UP (ref 1.6–2.6)
MCHC RBC-ENTMCNC: 30.7 PG — SIGNIFICANT CHANGE UP (ref 27–34)
MCHC RBC-ENTMCNC: 33.4 GM/DL — SIGNIFICANT CHANGE UP (ref 32–36)
MCV RBC AUTO: 91.8 FL — SIGNIFICANT CHANGE UP (ref 80–100)
MONOCYTES # BLD AUTO: 0.89 K/UL — SIGNIFICANT CHANGE UP (ref 0–0.9)
MONOCYTES NFR BLD AUTO: 11.8 % — SIGNIFICANT CHANGE UP (ref 2–14)
NEUTROPHILS # BLD AUTO: 5.08 K/UL — SIGNIFICANT CHANGE UP (ref 1.8–7.4)
NEUTROPHILS NFR BLD AUTO: 67.3 % — SIGNIFICANT CHANGE UP (ref 43–77)
NRBC # BLD: 0 /100 WBCS — SIGNIFICANT CHANGE UP (ref 0–0)
PHOSPHATE SERPL-MCNC: 4.3 MG/DL — SIGNIFICANT CHANGE UP (ref 2.5–4.5)
PLATELET # BLD AUTO: 458 K/UL — HIGH (ref 150–400)
POTASSIUM SERPL-MCNC: 3.9 MMOL/L — SIGNIFICANT CHANGE UP (ref 3.5–5.3)
POTASSIUM SERPL-SCNC: 3.9 MMOL/L — SIGNIFICANT CHANGE UP (ref 3.5–5.3)
RBC # BLD: 4.14 M/UL — LOW (ref 4.2–5.8)
RBC # FLD: 12.4 % — SIGNIFICANT CHANGE UP (ref 10.3–14.5)
SODIUM SERPL-SCNC: 142 MMOL/L — SIGNIFICANT CHANGE UP (ref 135–145)
WBC # BLD: 7.55 K/UL — SIGNIFICANT CHANGE UP (ref 3.8–10.5)
WBC # FLD AUTO: 7.55 K/UL — SIGNIFICANT CHANGE UP (ref 3.8–10.5)

## 2020-03-31 PROCEDURE — 87798 DETECT AGENT NOS DNA AMP: CPT

## 2020-03-31 PROCEDURE — 36415 COLL VENOUS BLD VENIPUNCTURE: CPT

## 2020-03-31 PROCEDURE — 87486 CHLMYD PNEUM DNA AMP PROBE: CPT

## 2020-03-31 PROCEDURE — 83615 LACTATE (LD) (LDH) ENZYME: CPT

## 2020-03-31 PROCEDURE — 86803 HEPATITIS C AB TEST: CPT

## 2020-03-31 PROCEDURE — 84145 PROCALCITONIN (PCT): CPT

## 2020-03-31 PROCEDURE — 87633 RESP VIRUS 12-25 TARGETS: CPT

## 2020-03-31 PROCEDURE — 87086 URINE CULTURE/COLONY COUNT: CPT

## 2020-03-31 PROCEDURE — 87040 BLOOD CULTURE FOR BACTERIA: CPT

## 2020-03-31 PROCEDURE — U0001: CPT

## 2020-03-31 PROCEDURE — 82607 VITAMIN B-12: CPT

## 2020-03-31 PROCEDURE — 80053 COMPREHEN METABOLIC PANEL: CPT

## 2020-03-31 PROCEDURE — 83735 ASSAY OF MAGNESIUM: CPT

## 2020-03-31 PROCEDURE — 82550 ASSAY OF CK (CPK): CPT

## 2020-03-31 PROCEDURE — 99285 EMERGENCY DEPT VISIT HI MDM: CPT | Mod: 25

## 2020-03-31 PROCEDURE — 83036 HEMOGLOBIN GLYCOSYLATED A1C: CPT

## 2020-03-31 PROCEDURE — 86038 ANTINUCLEAR ANTIBODIES: CPT

## 2020-03-31 PROCEDURE — 74176 CT ABD & PELVIS W/O CONTRAST: CPT

## 2020-03-31 PROCEDURE — 83605 ASSAY OF LACTIC ACID: CPT

## 2020-03-31 PROCEDURE — 84300 ASSAY OF URINE SODIUM: CPT

## 2020-03-31 PROCEDURE — 71045 X-RAY EXAM CHEST 1 VIEW: CPT

## 2020-03-31 PROCEDURE — 84100 ASSAY OF PHOSPHORUS: CPT

## 2020-03-31 PROCEDURE — 83516 IMMUNOASSAY NONANTIBODY: CPT

## 2020-03-31 PROCEDURE — 71046 X-RAY EXAM CHEST 2 VIEWS: CPT

## 2020-03-31 PROCEDURE — 84156 ASSAY OF PROTEIN URINE: CPT

## 2020-03-31 PROCEDURE — 96374 THER/PROPH/DIAG INJ IV PUSH: CPT

## 2020-03-31 PROCEDURE — 82728 ASSAY OF FERRITIN: CPT

## 2020-03-31 PROCEDURE — 87581 M.PNEUMON DNA AMP PROBE: CPT

## 2020-03-31 PROCEDURE — 94640 AIRWAY INHALATION TREATMENT: CPT

## 2020-03-31 PROCEDURE — 80061 LIPID PANEL: CPT

## 2020-03-31 PROCEDURE — 80048 BASIC METABOLIC PNL TOTAL CA: CPT

## 2020-03-31 PROCEDURE — 84443 ASSAY THYROID STIM HORMONE: CPT

## 2020-03-31 PROCEDURE — 83935 ASSAY OF URINE OSMOLALITY: CPT

## 2020-03-31 PROCEDURE — 81001 URINALYSIS AUTO W/SCOPE: CPT

## 2020-03-31 PROCEDURE — 82746 ASSAY OF FOLIC ACID SERUM: CPT

## 2020-03-31 PROCEDURE — 82306 VITAMIN D 25 HYDROXY: CPT

## 2020-03-31 PROCEDURE — 82570 ASSAY OF URINE CREATININE: CPT

## 2020-03-31 PROCEDURE — 85027 COMPLETE CBC AUTOMATED: CPT

## 2020-03-31 PROCEDURE — 87631 RESP VIRUS 3-5 TARGETS: CPT

## 2020-03-31 RX ORDER — THIAMINE MONONITRATE (VIT B1) 100 MG
1 TABLET ORAL
Qty: 7 | Refills: 0
Start: 2020-03-31 | End: 2020-04-06

## 2020-03-31 RX ORDER — CHOLECALCIFEROL (VITAMIN D3) 125 MCG
1 CAPSULE ORAL
Qty: 30 | Refills: 0
Start: 2020-03-31 | End: 2020-04-29

## 2020-03-31 RX ORDER — ASCORBIC ACID 60 MG
1 TABLET,CHEWABLE ORAL
Qty: 14 | Refills: 0
Start: 2020-03-31 | End: 2020-04-06

## 2020-03-31 RX ADMIN — Medication 1000 UNIT(S): at 12:33

## 2020-03-31 RX ADMIN — Medication 100 MILLIGRAM(S): at 12:33

## 2020-03-31 RX ADMIN — Medication 500 MILLIGRAM(S): at 05:37

## 2020-03-31 RX ADMIN — ZINC SULFATE TAB 220 MG (50 MG ZINC EQUIVALENT) 220 MILLIGRAM(S): 220 (50 ZN) TAB at 12:33

## 2020-03-31 RX ADMIN — ENOXAPARIN SODIUM 40 MILLIGRAM(S): 100 INJECTION SUBCUTANEOUS at 12:33

## 2020-03-31 NOTE — PROGRESS NOTE ADULT - PROBLEM SELECTOR PROBLEM 2
HTN (hypertension)
HANNAH (acute kidney injury)

## 2020-03-31 NOTE — PROGRESS NOTE ADULT - PROBLEM SELECTOR PLAN 2
monitor BP  Cont meds.
creat; 2.5   - Ed course: 1L   - IV fluids   -may likely be due to rhabdomyolosis  - urine lytes ordered   - Dr Fowler consulted   - Hold Losartan - HCTZ (home meds)   - Monitor BMP
creat; 2.5 on admission  - Ed course: 1L   - IV fluids   - likely be due to rhabdomyolosis  -now within normal limits   - f/u nephro Dr Fowler   - Hold Losartan - HCTZ (home meds)   - Monitor BMP
creat; 2.5 on admission  - treated with IV fluids   - creatinine now within normal limits   - f/u nephro Dr Fowler   - Hold Losartan - HCTZ (home meds)   - Monitor BMP

## 2020-03-31 NOTE — PROGRESS NOTE ADULT - PROBLEM SELECTOR PROBLEM 3
HANNAH (acute kidney injury)
Bladder wall thickening

## 2020-03-31 NOTE — DISCHARGE NOTE NURSING/CASE MANAGEMENT/SOCIAL WORK - PATIENT PORTAL LINK FT
You can access the FollowMyHealth Patient Portal offered by [facility] by registering at the following website: [email or web address]. By joining [employer]’s FollowMyHealth portal, you will also be able to view your health information using other applications (apps) compatible with our system.

## 2020-03-31 NOTE — PROGRESS NOTE ADULT - PROBLEM SELECTOR PLAN 1
COVID-19+  Continue Antibiotics  Oxygen Supp   Monitor SpO2.  Monitor temp and WBC.  IVF  LDH, Ferritin and CRP  Zofran IV for nausea   Albuterol inhaler PRN.   Tylenol and Robitussin PRN   Isolation precautions.  Vit C. D, B12 and Zinc as needed. COVID-19+  Off Antibiotics  Oxygen Supp   Monitor SpO2.  Monitor temp and WBC.  IVF  LDH, Ferritin and CRP  Zofran IV for nausea   Albuterol inhaler PRN.   Tylenol and Robitussin PRN   Isolation precautions.  Vit C. D, B12 and Zinc as needed.

## 2020-03-31 NOTE — PROGRESS NOTE ADULT - SUBJECTIVE AND OBJECTIVE BOX
Patient is a 59y old  Male who presents with a chief complaint of hematuria and fever (30 Mar 2020 11:32)    pt seen in tele [ x ], reg med floor [   ], bed [ x ], chair at bedside [   ], a+o x3 [x  ], lethargic [  ],    nad [ x ]     pt states feeling better]    Allergies    No Known Allergies        Vitals    T(F): 98.6 (03-31-20 @ 04:30), Max: 98.6 (03-31-20 @ 04:30)  HR: 55 (03-31-20 @ 04:30) (51 - 90)  BP: 146/86 (03-31-20 @ 04:30) (134/81 - 150/79)  RR: 20 (03-31-20 @ 04:30) (18 - 26)  SpO2: 93% (03-31-20 @ 04:30) (92% - 96%)  Wt(kg): --  CAPILLARY BLOOD GLUCOSE          Labs                          13.0   6.39  )-----------( 311      ( 30 Mar 2020 08:18 )             38.8       03-30    141  |  106  |  18  ----------------------------<  97  4.9   |  28  |  0.92    Ca    9.3      30 Mar 2020 08:18  Phos  4.2     03-30  Mg     2.5     03-30              .Urine  03-22 @ 21:59   No growth  --  --          Radiology Results      Meds    MEDICATIONS  (STANDING):  ascorbic acid 500 milliGRAM(s) Oral two times a day  cholecalciferol 1000 Unit(s) Oral daily  dextrose 5% + sodium chloride 0.9%. 1000 milliLiter(s) (70 mL/Hr) IV Continuous <Continuous>  enoxaparin Injectable 40 milliGRAM(s) SubCutaneous daily  lactated ringers 1000 milliLiter(s) (100 mL/Hr) IV Continuous <Continuous>  thiamine 100 milliGRAM(s) Oral daily  zinc sulfate 220 milliGRAM(s) Oral daily      MEDICATIONS  (PRN):  acetaminophen   Tablet .. 650 milliGRAM(s) Oral every 6 hours PRN Temp greater or equal to 38C (100.4F), Moderate Pain (4 - 6)  ALBUTerol    90 MICROgram(s) HFA Inhaler 2 Puff(s) Inhalation every 6 hours PRN Bronchospasm  guaiFENesin   Syrup  (Sugar-Free) 200 milliGRAM(s) Oral every 6 hours PRN Cough  ondansetron Injectable 4 milliGRAM(s) IV Push every 4 hours PRN Nausea and/or Vomiting      Physical Exam    Neuro :  no focal deficits  Respiratory: CTA B/L  CV: RRR, S1S2, no murmurs,   Abdominal: Soft, NT, ND +BS,  Extremities: No edema, + peripheral pulses    ASSESSMENT    multifocal Pneumonia due to organism   r/o covid 19   mild rhabdomyolysis  s/p juno  s/p hyponatremia  h/o HTN (hypertension)  H/O colectomy        PLAN      complete 5 day zithromax  rsv neg noted above  covid -19 positive   afebrile  tmx 99  O2 sat on 2l n/c 95% ( range 92-95%)   taper O2 to ra  cont plaquenil 200mg bid x day 4/4  cont zinc 220 daily  contact and airborne isolation   rept cxr with increasing infilt noted above  cont albuterol inhaler   procalcitonin neg noted above  cont supportive care with tylenol prn, robitussin prn and O2 via nasal canula if needed   blood and ucx neg  pulm f/u   ct abd with Mural thickening of the urinary bladder, possibly due to underdistention. Underlying cystitis is not excluded noted above.   doubt hematuria at present as ua neg for rbc's or infectious process  elevated ck improving noted above  d/c ivf  serum creat wnl noted above  renal f/u   cont current meds   d/c plan for am if O2 sat on ra >94% Patient is a 59y old  Male who presents with a chief complaint of hematuria and fever (30 Mar 2020 11:32)    pt seen in tele [ x ], reg med floor [   ], bed [ x ], chair at bedside [   ], a+o x3 [x  ], lethargic [  ],    nad [ x ]     pt states feeling better]    Allergies    No Known Allergies        Vitals    T(F): 98.6 (03-31-20 @ 04:30), Max: 98.6 (03-31-20 @ 04:30)  HR: 55 (03-31-20 @ 04:30) (51 - 90)  BP: 146/86 (03-31-20 @ 04:30) (134/81 - 150/79)  RR: 20 (03-31-20 @ 04:30) (18 - 26)  SpO2: 93% (03-31-20 @ 04:30) (92% - 96%)  Wt(kg): --  CAPILLARY BLOOD GLUCOSE          Labs                          13.0   6.39  )-----------( 311      ( 30 Mar 2020 08:18 )             38.8       03-30    141  |  106  |  18  ----------------------------<  97  4.9   |  28  |  0.92    Ca    9.3      30 Mar 2020 08:18  Phos  4.2     03-30  Mg     2.5     03-30              .Urine  03-22 @ 21:59   No growth  --  --          Radiology Results      Meds    MEDICATIONS  (STANDING):  ascorbic acid 500 milliGRAM(s) Oral two times a day  cholecalciferol 1000 Unit(s) Oral daily  dextrose 5% + sodium chloride 0.9%. 1000 milliLiter(s) (70 mL/Hr) IV Continuous <Continuous>  enoxaparin Injectable 40 milliGRAM(s) SubCutaneous daily  lactated ringers 1000 milliLiter(s) (100 mL/Hr) IV Continuous <Continuous>  thiamine 100 milliGRAM(s) Oral daily  zinc sulfate 220 milliGRAM(s) Oral daily      MEDICATIONS  (PRN):  acetaminophen   Tablet .. 650 milliGRAM(s) Oral every 6 hours PRN Temp greater or equal to 38C (100.4F), Moderate Pain (4 - 6)  ALBUTerol    90 MICROgram(s) HFA Inhaler 2 Puff(s) Inhalation every 6 hours PRN Bronchospasm  guaiFENesin   Syrup  (Sugar-Free) 200 milliGRAM(s) Oral every 6 hours PRN Cough  ondansetron Injectable 4 milliGRAM(s) IV Push every 4 hours PRN Nausea and/or Vomiting      Physical Exam    Neuro :  no focal deficits  Respiratory: CTA B/L  CV: RRR, S1S2, no murmurs,   Abdominal: Soft, NT, ND +BS,  Extremities: No edema, + peripheral pulses    ASSESSMENT    multifocal Pneumonia due to organism   r/o covid 19   mild rhabdomyolysis  s/p juno  s/p hyponatremia  h/o HTN (hypertension)  H/O colectomy        PLAN      complete 5 day zithromax  rsv neg noted above  covid -19 positive   afebrile  tmx 98.6  O2 sat on 2l n/c 95% ( range 92-96%)   taper O2 to ra  completed plaquenil x 5 day  completed zinc 220 daily  contact and airborne isolation   rept cxr with increasing infilt noted   cont albuterol inhaler   procalcitonin neg noted above  cont supportive care with tylenol prn, robitussin prn and O2 via nasal canula if needed   blood and ucx neg  pulm f/u   ct abd with Mural thickening of the urinary bladder, possibly due to underdistention. Underlying cystitis is not excluded noted above.   doubt hematuria at present as ua neg for rbc's or infectious process  elevated ck improving noted above  d/c ivf  serum creat wnl noted above  renal f/u   cont current meds   d/c plan if O2 sat remains stable on ra

## 2020-03-31 NOTE — PROGRESS NOTE ADULT - REASON FOR ADMISSION
hematuria and fever

## 2020-03-31 NOTE — PROGRESS NOTE ADULT - PROVIDER SPECIALTY LIST ADULT
Internal Medicine
Pulmonology

## 2020-03-31 NOTE — PROGRESS NOTE ADULT - SUBJECTIVE AND OBJECTIVE BOX
Patient is a 59y old  Male who presents with a chief complaint of hematuria and fever (31 Mar 2020 06:38)    Pt is Awake, alert, lying in bed in NAD. Still on isolation precautions. No cough or sob, on O2NC. Afebrile. Feels better. SpO2 95%.     INTERVAL HPI/OVERNIGHT EVENTS:      VITAL SIGNS:  T(F): 98.4 (03-31-20 @ 08:10)  HR: 49 (03-31-20 @ 08:10)  BP: 139/77 (03-31-20 @ 08:10)  RR: 20 (03-31-20 @ 08:10)  SpO2: 95% (03-31-20 @ 08:10)  Wt(kg): --  I&O's Detail    31 Mar 2020 07:01  -  31 Mar 2020 10:52  --------------------------------------------------------  IN:    Oral Fluid: 210 mL  Total IN: 210 mL    OUT:  Total OUT: 0 mL    Total NET: 210 mL              REVIEW OF SYSTEMS:    CONSTITUTIONAL:  No fevers, chills, sweats    HEENT:  Eyes:  No diplopia or blurred vision. ENT:  No earache, sore throat or runny nose.    CARDIOVASCULAR:  No pressure, squeezing, tightness, or heaviness about the chest; no palpitations.    RESPIRATORY:  Per HPI    GASTROINTESTINAL:  No abdominal pain, nausea, vomiting or diarrhea.    GENITOURINARY:  No dysuria, frequency or urgency.    NEUROLOGIC:  No paresthesias, fasciculations, seizures or weakness.    PSYCHIATRIC:  No disorder of thought or mood.      PHYSICAL EXAM:    Constitutional: Well developed and nourished  Eyes:Perrla  ENMT: normal  Neck:supple  Respiratory: good air entry  Cardiovascular: S1 S2 regular  Gastrointestinal: Soft, Non tender  Extremities: No edema  Vascular:normal  Neurological:Awake, alert,Ox3  Musculoskeletal:Normal      MEDICATIONS  (STANDING):  ascorbic acid 500 milliGRAM(s) Oral two times a day  cholecalciferol 1000 Unit(s) Oral daily  dextrose 5% + sodium chloride 0.9%. 1000 milliLiter(s) (70 mL/Hr) IV Continuous <Continuous>  enoxaparin Injectable 40 milliGRAM(s) SubCutaneous daily  lactated ringers 1000 milliLiter(s) (100 mL/Hr) IV Continuous <Continuous>  thiamine 100 milliGRAM(s) Oral daily  zinc sulfate 220 milliGRAM(s) Oral daily    MEDICATIONS  (PRN):  acetaminophen   Tablet .. 650 milliGRAM(s) Oral every 6 hours PRN Temp greater or equal to 38C (100.4F), Moderate Pain (4 - 6)  ALBUTerol    90 MICROgram(s) HFA Inhaler 2 Puff(s) Inhalation every 6 hours PRN Bronchospasm  guaiFENesin   Syrup  (Sugar-Free) 200 milliGRAM(s) Oral every 6 hours PRN Cough  ondansetron Injectable 4 milliGRAM(s) IV Push every 4 hours PRN Nausea and/or Vomiting      Allergies    No Known Allergies    Intolerances        LABS:                        13.0   6.39  )-----------( 311      ( 30 Mar 2020 08:18 )             38.8     03-30    141  |  106  |  18  ----------------------------<  97  4.9   |  28  |  0.92    Ca    9.3      30 Mar 2020 08:18  Phos  4.2     03-30  Mg     2.5     03-30                CAPILLARY BLOOD GLUCOSE        COVID-19 PCR . (03.23.20 @ 09:59)    COVID-19 PCR: Detected: All “detected” results on this new test are considered presumptively  positive results, are clinically actionable, and specimens will be  forwarded to [facility] for confirmation testing.  Another report (corrected report) will only be issued if discordant  results occur.  This test has been validated by [employer] to be accurate;  though it has not been FDA cleared/approved by the usual pathway.  As with all laboratory tests, results should be correlated with clinical  findings.        RADIOLOGY & ADDITIONAL TESTS:    CXR:    < from: Xray Chest 1 View- PORTABLE-Routine (03.26.20 @ 13:25) >  IMPRESSION: Increasing infiltrates.    < end of copied text >    Ct scan chest:    ekg;    echo: Patient is a 59y old  Male who presents with a chief complaint of hematuria and fever (31 Mar 2020 06:38)    Pt is Awake, alert, lying in bed in NAD. Still on isolation precautions. No cough or sob, on O2NC. Afebrile. Feels better. SpO2 95%. No further diarrhea    INTERVAL HPI/OVERNIGHT EVENTS:      VITAL SIGNS:  T(F): 98.4 (03-31-20 @ 08:10)  HR: 49 (03-31-20 @ 08:10)  BP: 139/77 (03-31-20 @ 08:10)  RR: 20 (03-31-20 @ 08:10)  SpO2: 95% (03-31-20 @ 08:10)  Wt(kg): --  I&O's Detail    31 Mar 2020 07:01  -  31 Mar 2020 10:52  --------------------------------------------------------  IN:    Oral Fluid: 210 mL  Total IN: 210 mL    OUT:  Total OUT: 0 mL    Total NET: 210 mL              REVIEW OF SYSTEMS:    CONSTITUTIONAL:  No fevers, chills, sweats    HEENT:  Eyes:  No diplopia or blurred vision. ENT:  No earache, sore throat or runny nose.    CARDIOVASCULAR:  No pressure, squeezing, tightness, or heaviness about the chest; no palpitations.    RESPIRATORY:  Per HPI    GASTROINTESTINAL:  No abdominal pain, nausea, vomiting or diarrhea.    GENITOURINARY:  No dysuria, frequency or urgency.    NEUROLOGIC:  No paresthesias, fasciculations, seizures or weakness.    PSYCHIATRIC:  No disorder of thought or mood.      PHYSICAL EXAM:    Constitutional: Well developed and nourished  Eyes:Perrla  ENMT: normal  Neck:supple  Respiratory: good air entry  Cardiovascular: S1 S2 regular  Gastrointestinal: Soft, Non tender  Extremities: No edema  Vascular:normal  Neurological:Awake, alert,Ox3  Musculoskeletal:Normal      MEDICATIONS  (STANDING):  ascorbic acid 500 milliGRAM(s) Oral two times a day  cholecalciferol 1000 Unit(s) Oral daily  dextrose 5% + sodium chloride 0.9%. 1000 milliLiter(s) (70 mL/Hr) IV Continuous <Continuous>  enoxaparin Injectable 40 milliGRAM(s) SubCutaneous daily  lactated ringers 1000 milliLiter(s) (100 mL/Hr) IV Continuous <Continuous>  thiamine 100 milliGRAM(s) Oral daily  zinc sulfate 220 milliGRAM(s) Oral daily    MEDICATIONS  (PRN):  acetaminophen   Tablet .. 650 milliGRAM(s) Oral every 6 hours PRN Temp greater or equal to 38C (100.4F), Moderate Pain (4 - 6)  ALBUTerol    90 MICROgram(s) HFA Inhaler 2 Puff(s) Inhalation every 6 hours PRN Bronchospasm  guaiFENesin   Syrup  (Sugar-Free) 200 milliGRAM(s) Oral every 6 hours PRN Cough  ondansetron Injectable 4 milliGRAM(s) IV Push every 4 hours PRN Nausea and/or Vomiting      Allergies    No Known Allergies    Intolerances        LABS:                        13.0   6.39  )-----------( 311      ( 30 Mar 2020 08:18 )             38.8     03-30    141  |  106  |  18  ----------------------------<  97  4.9   |  28  |  0.92    Ca    9.3      30 Mar 2020 08:18  Phos  4.2     03-30  Mg     2.5     03-30                CAPILLARY BLOOD GLUCOSE        COVID-19 PCR . (03.23.20 @ 09:59)    COVID-19 PCR: Detected: All “detected” results on this new test are considered presumptively  positive results, are clinically actionable, and specimens will be  forwarded to [facility] for confirmation testing.  Another report (corrected report) will only be issued if discordant  results occur.  This test has been validated by [employer] to be accurate;  though it has not been FDA cleared/approved by the usual pathway.  As with all laboratory tests, results should be correlated with clinical  findings.        RADIOLOGY & ADDITIONAL TESTS:    CXR:    < from: Xray Chest 1 View- PORTABLE-Routine (03.26.20 @ 13:25) >  IMPRESSION: Increasing infiltrates.    < end of copied text >    Ct scan chest:    ekg;    echo:

## 2020-03-31 NOTE — PROGRESS NOTE ADULT - PROBLEM SELECTOR PLAN 3
Improved  Monitor BMP  IVF  CT abdomen/pelvis noted - urinary bladder mural thickening.    eval
Improved  Monitor BMP  IVF  CT abdomen/pelvis noted - urinary bladder mural thickening.    eval.
Improved  Monitor BMP  IVF  CT abdomen/pelvis noted - urinary bladder mural thickening.    eval.
monitor BP  Cont meds.
p/w blood in urine   UA: +blood but no RBC   - CPK noted to be elevated  -s/p IV fluids   CT scan: Mural thickening of the urinary bladder, possibly due to underdistention. Underlying cystitis is not excluded.  -CT imaging results due to underdistention most likely since UA had no RBCs   - Urine culture negative
p/w blood in urine   UA: +blood but no RBC   - CPK noted to be elevated  IV fluids   CT scan: Mural thickening of the urinary bladder, possibly due to underdistention. Underlying cystitis is not excluded.  -CT imaging results due to underdistention most likely since UA had no RBCs   - Follow Urine culture
p/w blood in urine   UA: +blood but no RBC   - CPK noted to be elevated  IV fluids   CT scan: Mural thickening of the urinary bladder, possibly due to underdistention. Underlying cystitis is not excluded.  -CT imaging results due to underdistention most likely since UA had no RBCs   - Urine culture negative

## 2020-03-31 NOTE — PROGRESS NOTE ADULT - PROBLEM SELECTOR PROBLEM 1
Bilateral pneumonia

## 2021-01-18 NOTE — ED ADULT NURSE NOTE - HOW OFTEN DO YOU HAVE A DRINK CONTAINING ALCOHOL?
How Severe Are Your Spot(S)?: moderate What Is The Reason For Today's Visit?: Upper Body Skin Exam Never